# Patient Record
Sex: FEMALE | Race: WHITE | Employment: OTHER | URBAN - METROPOLITAN AREA
[De-identification: names, ages, dates, MRNs, and addresses within clinical notes are randomized per-mention and may not be internally consistent; named-entity substitution may affect disease eponyms.]

---

## 2019-01-10 ENCOUNTER — HOSPITAL ENCOUNTER (OUTPATIENT)
Dept: MRI IMAGING | Age: 79
Discharge: HOME | End: 2019-01-10
Attending: FAMILY MEDICINE
Payer: MEDICARE

## 2019-01-10 DIAGNOSIS — R41.3 MEMORY LOSS: ICD-10-CM

## 2019-01-10 PROCEDURE — 70553 MRI BRAIN STEM W/O & W/DYE: CPT

## 2019-01-10 PROCEDURE — A9575 INJ GADOTERATE MEGLUMI 0.1ML: HCPCS

## 2019-01-10 PROCEDURE — 74011250636 HC RX REV CODE- 250/636

## 2019-01-10 RX ORDER — GADOTERATE MEGLUMINE 376.9 MG/ML
10 INJECTION INTRAVENOUS
Status: COMPLETED | OUTPATIENT
Start: 2019-01-10 | End: 2019-01-10

## 2019-01-10 RX ADMIN — GADOTERATE MEGLUMINE 10 ML: 376.9 INJECTION INTRAVENOUS at 10:07

## 2019-02-22 ENCOUNTER — OFFICE VISIT (OUTPATIENT)
Dept: NEUROLOGY | Age: 79
End: 2019-02-22

## 2019-02-22 VITALS
HEART RATE: 74 BPM | SYSTOLIC BLOOD PRESSURE: 122 MMHG | DIASTOLIC BLOOD PRESSURE: 70 MMHG | WEIGHT: 124.2 LBS | HEIGHT: 61 IN | BODY MASS INDEX: 23.45 KG/M2 | OXYGEN SATURATION: 99 % | RESPIRATION RATE: 16 BRPM

## 2019-02-22 DIAGNOSIS — G31.84 MILD COGNITIVE IMPAIRMENT: ICD-10-CM

## 2019-02-22 DIAGNOSIS — R41.3 MEMORY LOSS: Primary | ICD-10-CM

## 2019-02-22 RX ORDER — PRAVASTATIN SODIUM 20 MG/1
20 TABLET ORAL
COMMUNITY

## 2019-02-22 RX ORDER — CITALOPRAM 10 MG/1
10 TABLET ORAL DAILY
Qty: 30 TAB | Refills: 1 | Status: SHIPPED | OUTPATIENT
Start: 2019-02-22 | End: 2019-06-08 | Stop reason: SDUPTHER

## 2019-02-22 RX ORDER — CITALOPRAM 20 MG/1
TABLET, FILM COATED ORAL DAILY
COMMUNITY
End: 2019-06-10 | Stop reason: SDUPTHER

## 2019-02-22 NOTE — PROGRESS NOTES
Chief Complaint Patient presents with  Memory Loss HISTORY OF PRESENT ILLNESS Tran Velasquez is a 70 y.o. female who came in for neurological consultation requested by Dr. Dorothea Bennett. She came with her  and daughter. She has been having short-term memory issues for the past couple years. Family says that she will tend to ask the same questions or will repeat something that she has already said. She has also had some trouble remembering people's names and at times will have difficulty cooking recipes that she has done for a long period. She will by certain grocery items twice as she forgets that she has already it bought before. She has had occasional confusion about where she is and how to get to a certain place while driving but it eventually comes to her. Denies any headache, changes in vision, speech, swallowing ability, focal motor or sensory deficits, tremors, balance problems or falls. She takes citalopram for anxiety and has been on it for several years. She keeps wondering that if it was the medication that was giving her memory problem. She does feel anxious every now and then but nothing significant. History reviewed. No pertinent past medical history. Current Outpatient Medications Medication Sig  
 citalopram (CELEXA) 20 mg tablet Take  by mouth daily.  pravastatin (PRAVACHOL) 20 mg tablet Take 20 mg by mouth nightly.  citalopram (CELEXA) 10 mg tablet Take 1 Tab by mouth daily. No current facility-administered medications for this visit. Not on File Family History Problem Relation Age of Onset  Dementia Mother  Heart Disease Father  Cancer Maternal Aunt Social History Tobacco Use  Smoking status: Never Smoker  Smokeless tobacco: Never Used Substance Use Topics  Alcohol use: Yes Frequency: Never Comment: 4-6 glasses wine  Drug use: Not on file Past Surgical History:  
Procedure Laterality Date  HX ORTHOPAEDIC REVIEW OF SYSTEMS Review of Systems - History obtained from the patient Psychological ROS: positive for - anxiety ENT ROS: negative Hematological and Lymphatic ROS: negative Endocrine ROS: negative Respiratory ROS: no cough, shortness of breath, or wheezing Cardiovascular ROS: no chest pain or dyspnea on exertion Gastrointestinal ROS: no abdominal pain, change in bowel habits, or black or bloody stools Genito-Urinary ROS: no dysuria, trouble voiding, or hematuria Musculoskeletal ROS: negative Dermatological ROS: negative PHYSICAL EXAMINATION:   
Visit Vitals /70 Pulse 74 Resp 16 Ht 5' 1\" (1.549 m) Wt 56.3 kg (124 lb 3.2 oz) SpO2 99% BMI 23.47 kg/m² General:  Well defined, nourished, and groomed individual in no acute distress. Neck: Supple, nontender, no bruits, no pain with resistance to active range of motion. Heart: Regular rate and rhythm, no murmurs, rub, or gallop. Normal S1S2. Lungs:  Clear to auscultation bilaterally with equal chest expansion, no cough, no wheeze Musculoskeletal:  Extremities revealed no edema and had full range of motion of joints. Psych:  Good mood and bright affect NEUROLOGICAL EXAMINATION:    
Mental Status:   Alert and oriented to person, place, and time. She scored 25/30 on Hazel Hurst cognitive assessment. Her delayed recall was 0/5. Attention span and concentration are normal. Speech is fluent with a full fund of knowledge. Cranial Nerves:   
II, III, IV, VI:  Visual acuity grossly intact. Visual fields are normal.   
Pupils are equal, round, and reactive to light and accommodation. Extra-ocular movements are full and fluid. Fundoscopic exam was benign, no ptosis or nystagmus. V-XII: Hearing is grossly intact. Facial features are symmetric, with normal sensation and strength. The palate rises symmetrically and the tongue protrudes midline. Sternocleidomastoids 5/5. Motor Examination: Normal tone, bulk, and strength. 5/5 muscle strength throughout. No cogwheel rigidity or clonus present. Sensory exam:  Normal throughout to pinprick, temperature, and vibration sense. Normal proprioception. Coordination:  Heel-to-shin was smooth and symmetrical bilaterally. Finger to nose and rapid arm movement testing was normal.   No resting or intention tremor Gait and Station:  Steady while walking on toes, heels, and with tandem walking. Normal arm swing. No Rhomberg or pronator drift. No muscle wasting or fasiculations noted. Reflexes:  DTRs 2+ throughout. Toes downgoing. LABS / IMAGING 
MRI of the brain done recently showed nonspecific age-related changes, per patient and family ASSESSMENT 
  ICD-10-CM ICD-9-CM 1. Memory loss R41.3 780.93 VITAMIN B12 & FOLATE  
   TSH 3RD GENERATION  
   citalopram (CELEXA) 10 mg tablet REFERRAL TO NEUROPSYCHOLOGY 2. Mild cognitive impairment G31.84 331.83 DISCUSSION Ms. Madhav Kelsey has significantly impaired short-term memory but did quite well on all other cognitive domains. We discussed that this could be mild cognitive impairment affecting short-term memory. MCI can have different courses-it can remained stable, improve or some often consider this as early stages of neurodegenerative dementia of Alzheimer's type We will continue monitoring her clinically and deferring any pharmacologic therapy for now Check B12 level and TSH Given patient's concern of medication side effects, will try lowering the dose of citalopram.  If her symptoms worsen, she will need to increase it back up Obtain comprehensive neuropsychological assessment to rule out underlying anxiety/attention and concentration deficit as a contributing factor Thank you for allowing me to participate in the care of Ms. Jeaneth Chaudhry. Please feel free to contact me if you have any questions.  I will be happy to follow to follow her along with you. Greta Lara MD 
Diplomate, American Board of Psychiatry & Neurology (Neurology) [place] Board of Psychiatry & Neurology (Clinical Neurophysiology) Diplomate, [address] [place] [address] Board of Electrodiagnostic Medicine This note will not be viewable in [address]

## 2019-02-22 NOTE — PROGRESS NOTES
Ms. Marcelino Patel presents as a new patient for evaluation of memory loss. Her family is present at appointment and they report a decline in patient's memory over the past two years. Depression screening done on patient.

## 2019-02-22 NOTE — PATIENT INSTRUCTIONS
A Healthy Lifestyle: Care Instructions Your Care Instructions A healthy lifestyle can help you feel good, stay at a healthy weight, and have plenty of energy for both work and play. A healthy lifestyle is something you can share with your whole family. A healthy lifestyle also can lower your risk for serious health problems, such as high blood pressure, heart disease, and diabetes. You can follow a few steps listed below to improve your health and the health of your family. Follow-up care is a key part of your treatment and safety. Be sure to make and go to all appointments, and call your doctor if you are having problems. It's also a good idea to know your test results and keep a list of the medicines you take. How can you care for yourself at home? · Do not eat too much sugar, fat, or fast foods. You can still have dessert and treats now and then. The goal is moderation. · Start small to improve your eating habits. Pay attention to portion sizes, drink less juice and soda pop, and eat more fruits and vegetables. ? Eat a healthy amount of food. A 3-ounce serving of meat, for example, is about the size of a deck of cards. Fill the rest of your plate with vegetables and whole grains. ? Limit the amount of soda and sports drinks you have every day. Drink more water when you are thirsty. ? Eat at least 5 servings of fruits and vegetables every day. It may seem like a lot, but it is not hard to reach this goal. A serving or helping is 1 piece of fruit, 1 cup of vegetables, or 2 cups of leafy, raw vegetables. Have an apple or some carrot sticks as an afternoon snack instead of a candy bar. Try to have fruits and/or vegetables at every meal. 
· Make exercise part of your daily routine. You may want to start with simple activities, such as walking, bicycling, or slow swimming. Try to be active 30 to 60 minutes every day.  You do not need to do all 30 to 60 minutes all at once. For example, you can exercise 3 times a day for 10 or 20 minutes. Moderate exercise is safe for most people, but it is always a good idea to talk to your doctor before starting an exercise program. 
· Keep moving. Arie Buddle the lawn, work in the garden, or beModel. Take the stairs instead of the elevator at work. · If you smoke, quit. People who smoke have an increased risk for heart attack, stroke, cancer, and other lung illnesses. Quitting is hard, but there are ways to boost your chance of quitting tobacco for good. ? Use nicotine gum, patches, or lozenges. ? Ask your doctor about stop-smoking programs and medicines. ? Keep trying. In addition to reducing your risk of diseases in the future, you will notice some benefits soon after you stop using tobacco. If you have shortness of breath or asthma symptoms, they will likely get better within a few weeks after you quit. · Limit how much alcohol you drink. Moderate amounts of alcohol (up to 2 drinks a day for men, 1 drink a day for women) are okay. But drinking too much can lead to liver problems, high blood pressure, and other health problems. Family health If you have a family, there are many things you can do together to improve your health. · Eat meals together as a family as often as possible. · Eat healthy foods. This includes fruits, vegetables, lean meats and dairy, and whole grains. · Include your family in your fitness plan. Most people think of activities such as jogging or tennis as the way to fitness, but there are many ways you and your family can be more active. Anything that makes you breathe hard and gets your heart pumping is exercise. Here are some tips: 
? Walk to do errands or to take your child to school or the bus. 
? Go for a family bike ride after dinner instead of watching TV. Where can you learn more? Go to http://claudette-katarzyna.info/. Enter F277 in the search box to learn more about \"A Healthy Lifestyle: Care Instructions. \" Current as of: September 11, 2018 Content Version: 11.9 © 0164-5001 [employer]. Care instructions adapted under license by [employer] (which disclaims liability or warranty for this information). If you have questions about a medical condition or this instruction, always ask your healthcare professional. [place] [address] any warranty or liability for your use of this information.

## 2019-02-23 LAB
FOLATE SERPL-MCNC: 6.8 NG/ML
TSH SERPL DL<=0.005 MIU/L-ACNC: 2.01 UIU/ML (ref 0.45–4.5)
VIT B12 SERPL-MCNC: 274 PG/ML (ref 232–1245)

## 2019-02-28 ENCOUNTER — DOCUMENTATION ONLY (OUTPATIENT)
Dept: NEUROLOGY | Age: 79
End: 2019-02-28

## 2019-03-01 ENCOUNTER — TELEPHONE (OUTPATIENT)
Dept: NEUROLOGY | Age: 79
End: 2019-03-01

## 2019-03-01 NOTE — TELEPHONE ENCOUNTER
I reviewed lab results with patient, per Dr. Vic Agudelo. Patient agrees to start Vit B12 1,000mcg sublingual every day.

## 2019-08-20 ENCOUNTER — OFFICE VISIT (OUTPATIENT)
Dept: NEUROLOGY | Age: 79
End: 2019-08-20

## 2019-08-20 VITALS
RESPIRATION RATE: 14 BRPM | BODY MASS INDEX: 23.22 KG/M2 | WEIGHT: 123 LBS | DIASTOLIC BLOOD PRESSURE: 65 MMHG | SYSTOLIC BLOOD PRESSURE: 137 MMHG | HEIGHT: 61 IN | OXYGEN SATURATION: 98 % | HEART RATE: 66 BPM

## 2019-08-20 DIAGNOSIS — F03.90 DEMENTIA WITHOUT BEHAVIORAL DISTURBANCE, UNSPECIFIED DEMENTIA TYPE: Primary | ICD-10-CM

## 2019-08-20 DIAGNOSIS — E53.8 LOW SERUM VITAMIN B12: ICD-10-CM

## 2019-08-20 RX ORDER — DONEPEZIL HYDROCHLORIDE 10 MG/1
5 TABLET, FILM COATED ORAL
COMMUNITY

## 2019-08-20 NOTE — PROGRESS NOTES
Pt is following up on her memory. Someone else put her on Aricept but she is not sure of the dose. Maybe a little worse since we last saw her.

## 2019-08-20 NOTE — PROGRESS NOTES
Chief Complaint   Patient presents with    Memory Loss         HISTORY OF PRESENT ILLNESS  Crystal Spencer came back for follow-up. She came with her  and daughter. Family tells me that she is not having more difficulties with her short-term memory. Remains independent with activities of daily living. She does household activities without problem. She has been driving and got lost.  Does not go much out of the house in general.  Does some crossword puzzles during the day. She does cook and denies difficulties with recipes. She had comprehensive neuropsychological assessment and the report was reviewed. It was consistent with dementia and anxiety. She has now been started on donepezil and is taking 10 mg daily. She had difficulty tolerating it initially but now she is fine. RECAP  She has been having short-term memory issues for the past couple years. Family says that she will tend to ask the same questions or will repeat something that she has already said. She has also had some trouble remembering people's names and at times will have difficulty cooking recipes that she has done for a long period. She will by certain grocery items twice as she forgets that she has already it bought before. She has had occasional confusion about where she is and how to get to a certain place while driving but it eventually comes to her. Denies any headache, changes in vision, speech, swallowing ability, focal motor or sensory deficits, tremors, balance problems or falls. She takes citalopram for anxiety and has been on it for several years. She keeps wondering that if it was the medication that was giving her memory problem. She does feel anxious every now and then but nothing significant. History reviewed. No pertinent past medical history. Current Outpatient Medications   Medication Sig    donepezil (ARICEPT) 10 mg tablet Take 10 mg by mouth nightly.     pravastatin (PRAVACHOL) 20 mg tablet Take 20 mg by mouth nightly.  citalopram (CELEXA) 20 mg tablet Take 1 Tab by mouth daily. No current facility-administered medications for this visit. PHYSICAL EXAMINATION:    Visit Vitals  /65   Pulse 66   Resp 14   Ht 5' 1\" (1.549 m)   Wt 55.8 kg (123 lb)   SpO2 98%   BMI 23.24 kg/m²         NEUROLOGICAL EXAMINATION:     Mental Status:   Alert and oriented to person, place, and time. He scored 23/30 on Wilbert cognitive assessment. Short-term recall was 0/5. She had mild difficulties with today's date correctly, serial 7 subtractions and abstraction   Attention span and concentration are normal. Speech is fluent . Cranial Nerves:    II, III, IV, VI:  Visual acuity grossly intact. Visual fields are normal.    Pupils are equal, round, and reactive. Extra-ocular movements are full and fluid. V-XII: Hearing is grossly intact. Facial features are symmetric, with normal sensation and strength. The palate rises symmetrically and the tongue protrudes midline. Motor Examination: Normal tone, bulk, and strength. Sensory exam:  Normal throughout     Coordination:  Finger to nose and rapid arm movement testing was normal.   No resting or intention tremor    Gait and Station:  Steady. Normal arm swing. No muscle wasting or fasiculations noted. LABS / IMAGING    MRI of the brain done recently showed nonspecific age-related changes, per patient and family  Lab Results   Component Value Date/Time    Vitamin B12 274 02/22/2019 01:51 PM    Folate 6.8 02/22/2019 01:51 PM     Lab Results   Component Value Date/Time    TSH 2.010 02/22/2019 01:51 PM       ASSESSMENT    ICD-10-CM ICD-9-CM    1. Dementia without behavioral disturbance, unspecified dementia type F03.90 294.20    2.  Low serum vitamin B12 E53.8 266.2        DISCUSSION  Ms. Mervat Lanza has has mild dementia, with mixed type   She has significantly impaired short-term memory but did quite well on all other cognitive domains. There has been a slight decline in her cognitive score in the last 6 months  Agree with continuing donepezil for now  Resources to strengthen her memory were discussed  She should try to stay physically active as well  Repeat B12 level   Continue citalopram  It would be best for her to avoid driving as she will have a high tendency to get lost  She has a very supportive family and is under 24-hour supervision  Continue clinical follow-up    I spent greater than 25 minutes with the patient and more than 50% of the time was spent in counseling and coordination of care. Scot Smith MD  Diplomate, American Board of Psychiatry & Neurology (Neurology)  Yasmin Horvath Board of Psychiatry & Neurology (Clinical Neurophysiology)  Diplomate, American Board of Electrodiagnostic Medicine  This note will not be viewable in [address]

## 2019-08-21 ENCOUNTER — TELEPHONE (OUTPATIENT)
Dept: NEUROLOGY | Age: 79
End: 2019-08-21

## 2019-08-21 NOTE — TELEPHONE ENCOUNTER
[facility], \"pls  Ew:alling to adv. Dr. Konstantin Porter re r/x.  Donepezil\"

## 2019-08-21 NOTE — TELEPHONE ENCOUNTER
Per caller, patient is on Aricept 5mg and would like to know if she needs to increase to 10mg. Please advise.

## 2019-09-28 LAB — VIT B12 SERPL-MCNC: >2000 PG/ML (ref 232–1245)

## 2020-02-25 ENCOUNTER — OFFICE VISIT (OUTPATIENT)
Dept: NEUROLOGY | Age: 80
End: 2020-02-25

## 2020-02-25 VITALS
SYSTOLIC BLOOD PRESSURE: 129 MMHG | RESPIRATION RATE: 16 BRPM | WEIGHT: 121 LBS | BODY MASS INDEX: 22.84 KG/M2 | OXYGEN SATURATION: 99 % | HEART RATE: 65 BPM | DIASTOLIC BLOOD PRESSURE: 75 MMHG | HEIGHT: 61 IN

## 2020-02-25 DIAGNOSIS — F03.90 DEMENTIA WITHOUT BEHAVIORAL DISTURBANCE, UNSPECIFIED DEMENTIA TYPE: Primary | ICD-10-CM

## 2020-02-25 NOTE — PROGRESS NOTES
MsKrishan Cristobal Party presents today to follow up memory loss. Depression screening done on patient.

## 2020-02-25 NOTE — PATIENT INSTRUCTIONS
PRESCRIPTION REFILL POLICY [facility] Statement to Patients April 1, 2014 In an effort to ensure the large volume of patient prescription refills is processed in the most efficient and expeditious manner, we are asking our patients to assist us by calling your Pharmacy for all prescription refills, this will include also your  Mail Order Pharmacy. The pharmacy will contact our office electronically to continue the refill process. Please do not wait until the last minute to call your pharmacy. We need at least 48 hours (2days) to fill prescriptions. We also encourage you to call your pharmacy before going to  your prescription to make sure it is ready. With regard to controlled substance prescription refill requests (narcotic refills) that need to be picked up at our office, we ask your cooperation by providing us with at least 72 hours (3days) notice that you will need a refill. We will not refill narcotic prescription refill requests after 4:00pm on any weekday, Monday through Thursday, or after 2:00pm on Fridays, or on the weekends. We encourage everyone to explore another way of getting your prescription refill request processed using Code42, our patient web portal through our electronic medical record system. Code42 is an efficient and effective way to communicate your medication request directly to the office and  downloadable as an brant on your smart phone . Code42 also features a review functionality that allows you to view your medication list as well as leave messages for your physician. Are you ready to get connected? If so please review the attatched instructions or speak to any of our staff to get you set up right away! Thank you so much for your cooperation. Should you have any questions please contact our Practice Administrator. The Physicians and Staff,  [facility]

## 2020-02-25 NOTE — PROGRESS NOTES
Chief Complaint   Patient presents with    Memory Loss         HISTORY OF PRESENT ILLNESS  Merlinda Oris came back for follow-up. She came with her  and daughter. Per family, she seems to be having some difficulties with long-term memory as well. Overall, she remains independent with activities of daily living. She still cooks, does things around the house, does sudoku and goes out for a walk regularly. Sometimes she will have difficulties with complex recipes. She drives but only does short distances. She had comprehensive neuropsychological assessment and the report was reviewed. It was consistent with dementia and anxiety. She is taking donepezil but only takes 5 mg daily. She thinks she was having difficulties with it at 10 mg     RECAP  She has been having short-term memory issues for the past couple years. Family says that she will tend to ask the same questions or will repeat something that she has already said. She has also had some trouble remembering people's names and at times will have difficulty cooking recipes that she has done for a long period. She will by certain grocery items twice as she forgets that she has already it bought before. She has had occasional confusion about where she is and how to get to a certain place while driving but it eventually comes to her. Denies any headache, changes in vision, speech, swallowing ability, focal motor or sensory deficits, tremors, balance problems or falls. She takes citalopram for anxiety and has been on it for several years. She keeps wondering that if it was the medication that was giving her memory problem. She does feel anxious every now and then but nothing significant. History reviewed. No pertinent past medical history. Current Outpatient Medications   Medication Sig    donepezil (ARICEPT) 10 mg tablet Take 5 mg by mouth nightly.  citalopram (CELEXA) 20 mg tablet Take 1 Tab by mouth daily.     pravastatin (PRAVACHOL) 20 mg tablet Take 20 mg by mouth nightly. No current facility-administered medications for this visit. PHYSICAL EXAMINATION:    Visit Vitals  /75   Pulse 65   Resp 16   Ht 5' 1\" (1.549 m)   Wt 54.9 kg (121 lb)   SpO2 99%   BMI 22.86 kg/m²         NEUROLOGICAL EXAMINATION:     Mental Status:   Alert and oriented to person, place, and time. He scored 23/30 on Graham cognitive assessment. Short-term recall was 0/5. She had difficulties with today's date correctly,   Attention span and concentration are normal. Speech is fluent . Cranial Nerves:    II, III, IV, VI:  Visual acuity grossly intact. Visual fields are normal.    Pupils are equal, round, and reactive. Extra-ocular movements are full and fluid. V-XII: Hearing is grossly intact. Facial features are symmetric, with normal sensation and strength. The palate rises symmetrically and the tongue protrudes midline. Motor Examination: Normal tone, bulk, and strength. Sensory exam:  Normal throughout     Coordination:  Finger to nose and rapid arm movement testing was normal.   No resting or intention tremor    Gait and Station:  Steady. Normal arm swing. No muscle wasting or fasiculations noted. LABS / IMAGING    MRI of the brain done recently showed nonspecific age-related changes, per patient and family  Lab Results   Component Value Date/Time    Vitamin B12 >2000 (H) 09/27/2019 04:01 PM    Folate 6.8 02/22/2019 01:51 PM     Lab Results   Component Value Date/Time    TSH 2.010 02/22/2019 01:51 PM       ASSESSMENT    ICD-10-CM ICD-9-CM    1. Dementia without behavioral disturbance, unspecified dementia type [facility]) F03.90 294.20        DISCUSSION  Ms. Roseline Ponce has has mild to moderate dementia, mixed type   She has significantly impaired short-term memory but did quite well on all other cognitive domains.    Her scores have been stable over the past 6 months  Agree with continuing donepezil for now and she should try to titrate up the dose to 10 mg if she can tolerate  Resources to strengthen her memory were discussed  Continue to do physical activity as she is doing  Continue citalopram  It would be best for her to avoid driving as she will have a high tendency to get lost  She has a very supportive family and is under 24-hour supervision  Continue periodic follow-up    I spent greater than 25 minutes with the patient and more than 50% of the time was spent in counseling and coordination of care. Danilo Awad MD  Diplomate, American Board of Psychiatry & Neurology (Neurology)  [place]iky [place] Board of Psychiatry & Neurology (Clinical Neurophysiology)  Diplomate, American Board of Electrodiagnostic Medicine  This note will not be viewable in [address]

## 2020-09-11 ENCOUNTER — OFFICE VISIT (OUTPATIENT)
Dept: NEUROLOGY | Facility: CLINIC | Age: 80
End: 2020-09-11
Payer: MEDICARE

## 2020-09-11 VITALS
RESPIRATION RATE: 16 BRPM | BODY MASS INDEX: 22.84 KG/M2 | WEIGHT: 121 LBS | SYSTOLIC BLOOD PRESSURE: 102 MMHG | DIASTOLIC BLOOD PRESSURE: 70 MMHG | HEIGHT: 61 IN | OXYGEN SATURATION: 98 % | HEART RATE: 72 BPM

## 2020-09-11 DIAGNOSIS — F03.90 DEMENTIA WITHOUT BEHAVIORAL DISTURBANCE, UNSPECIFIED DEMENTIA TYPE: Primary | ICD-10-CM

## 2020-09-11 PROCEDURE — 1090F PRES/ABSN URINE INCON ASSESS: CPT | Performed by: PSYCHIATRY & NEUROLOGY

## 2020-09-11 PROCEDURE — G8510 SCR DEP NEG, NO PLAN REQD: HCPCS | Performed by: PSYCHIATRY & NEUROLOGY

## 2020-09-11 PROCEDURE — G8427 DOCREV CUR MEDS BY ELIG CLIN: HCPCS | Performed by: PSYCHIATRY & NEUROLOGY

## 2020-09-11 PROCEDURE — 99214 OFFICE O/P EST MOD 30 MIN: CPT | Performed by: PSYCHIATRY & NEUROLOGY

## 2020-09-11 PROCEDURE — G8400 PT W/DXA NO RESULTS DOC: HCPCS | Performed by: PSYCHIATRY & NEUROLOGY

## 2020-09-11 PROCEDURE — G8420 CALC BMI NORM PARAMETERS: HCPCS | Performed by: PSYCHIATRY & NEUROLOGY

## 2020-09-11 PROCEDURE — G8536 NO DOC ELDER MAL SCRN: HCPCS | Performed by: PSYCHIATRY & NEUROLOGY

## 2020-09-11 PROCEDURE — 1101F PT FALLS ASSESS-DOCD LE1/YR: CPT | Performed by: PSYCHIATRY & NEUROLOGY

## 2020-09-11 RX ORDER — MEMANTINE HYDROCHLORIDE 5 MG-10 MG
KIT ORAL
Qty: 30 TAB | Refills: 0 | Status: SHIPPED | OUTPATIENT
Start: 2020-09-11 | End: 2020-10-18 | Stop reason: SDUPTHER

## 2020-09-11 NOTE — PROGRESS NOTES
Chief Complaint   Patient presents with    Memory Loss         HISTORY OF PRESENT ILLNESS  Jose Antonio Presley came back for follow-up. She came with her . Overall, there has been some decline in  her memory. Sometimes so she will have problems with logical thinking and reasoning. Still remains independent with activities of daily living. She still cooks, does things around the house, does sudoku and goes out for a walk regularly. She does not drive much    She had comprehensive neuropsychological assessment and it was consistent with dementia and anxiety. She is taking donepezil but only takes 5 mg daily. She thinks she was having difficulties with it at 10 mg     RECAP  She has been having short-term memory issues for the past couple years. Family says that she will tend to ask the same questions or will repeat something that she has already said. She has also had some trouble remembering people's names and at times will have difficulty cooking recipes that she has done for a long period. She will by certain grocery items twice as she forgets that she has already it bought before. She has had occasional confusion about where she is and how to get to a certain place while driving but it eventually comes to her. Denies any headache, changes in vision, speech, swallowing ability, focal motor or sensory deficits, tremors, balance problems or falls. She takes citalopram for anxiety and has been on it for several years. She keeps wondering that if it was the medication that was giving her memory problem. She does feel anxious every now and then but nothing significant. History reviewed. No pertinent past medical history. Current Outpatient Medications   Medication Sig    memantine 5-10 mg DsPk Take as directed    donepezil (ARICEPT) 10 mg tablet Take 5 mg by mouth nightly.  citalopram (CELEXA) 20 mg tablet Take 1 Tab by mouth daily.     pravastatin (PRAVACHOL) 20 mg tablet Take 20 mg by mouth nightly. No current facility-administered medications for this visit. PHYSICAL EXAMINATION:    Visit Vitals  /70   Pulse 72   Resp 16   Ht 5' 1\" (1.549 m)   Wt 54.9 kg (121 lb)   SpO2 98%   BMI 22.86 kg/m²         NEUROLOGICAL EXAMINATION:     Mental Status:   Alert and oriented to person, place, and time. He scored 22/30 on [place] cognitive assessment. Short-term recall was 0/5. She had difficulties with today's date correctly,   Attention span and concentration are normal. Speech is fluent . Cranial Nerves:    II, III, IV, VI:  Visual acuity grossly intact. Visual fields are normal.    Pupils are equal, round, and reactive. Extra-ocular movements are full and fluid. V-XII: Hearing is grossly intact. Facial features are symmetric, with normal sensation and strength. The palate rises symmetrically and the tongue protrudes midline. Motor Examination: Normal tone, bulk, and strength. Sensory exam:  Normal throughout     Coordination:  Finger to nose and rapid arm movement testing was normal.   No resting or intention tremor    Gait and Station:  Steady. Normal arm swing. No muscle wasting or fasiculations noted. LABS / IMAGING    MRI of the brain done recently showed nonspecific age-related changes, per patient and family  Lab Results   Component Value Date/Time    Vitamin B12 >2000 (H) 09/27/2019 04:01 PM    Folate 6.8 02/22/2019 01:51 PM     Lab Results   Component Value Date/Time    TSH 2.010 02/22/2019 01:51 PM       ASSESSMENT    ICD-10-CM ICD-9-CM    1. Dementia without behavioral disturbance, unspecified dementia type ([facility] [address]  F03.90 294.20 memantine 5-10 mg DsPk       DISCUSSION  Ms. Tania Beavers has has mild to moderate dementia, mixed type   She has significantly impaired short-term memory but did quite well on all other cognitive domains.    There has been a subtle decline over the past 6 months  She can only tolerate 5 mg of donepezil  Will start Namenda at 5 mg daily and titrate in a customary fashion to 10 mg twice daily  Continue to stay physically and mentally active  It would be best for her to avoid driving as she will have a high tendency to get lost  She has a very supportive family and is under 24-hour supervision  Follow-up in 6 months    I spent greater than 25 minutes with the patient and more than 50% of the time was spent in counseling and coordination of care. Mason Acosta MD  Diplomate, American Board of Psychiatry & Neurology (Neurology)  Shanel Robledo Board of Psychiatry & Neurology (Clinical Neurophysiology)  Diplomate, American Board of Electrodiagnostic Medicine    This note will not be viewable in [address]

## 2020-09-11 NOTE — PROGRESS NOTES
Ms. Pandya Signs present today to follow up memory loss. Patient's spouse reported patient has good days and bad days.

## 2020-10-16 DIAGNOSIS — F03.90 DEMENTIA WITHOUT BEHAVIORAL DISTURBANCE, UNSPECIFIED DEMENTIA TYPE: ICD-10-CM

## 2020-10-16 NOTE — TELEPHONE ENCOUNTER
Requested Prescriptions     Pending Prescriptions Disp Refills    memantine 5-10 mg DsPk 30 Tab 0     Sig: Take as directed

## 2020-10-18 DIAGNOSIS — F03.90 DEMENTIA WITHOUT BEHAVIORAL DISTURBANCE, UNSPECIFIED DEMENTIA TYPE: Primary | ICD-10-CM

## 2020-10-18 RX ORDER — MEMANTINE HYDROCHLORIDE 10 MG/1
10 TABLET ORAL 2 TIMES DAILY
Qty: 60 TAB | Refills: 2 | Status: SHIPPED | OUTPATIENT
Start: 2020-10-18 | End: 2021-01-11

## 2020-10-18 RX ORDER — MEMANTINE HYDROCHLORIDE 5 MG-10 MG
KIT ORAL
Qty: 30 TAB | Refills: 0 | OUTPATIENT
Start: 2020-10-18

## 2020-10-20 NOTE — TELEPHONE ENCOUNTER
----- Message from [facility] sent at 10/20/2020  2:19 PM EDT -----  Regarding: Dr Violette Arnold  Pt is calling to check the status of her refill on Memantine HCL 10 mg, requested it on Friday, please call pt at [phone]

## 2020-10-21 ENCOUNTER — TELEPHONE (OUTPATIENT)
Dept: NEUROLOGY | Facility: CLINIC | Age: 80
End: 2020-10-21

## 2020-10-21 NOTE — TELEPHONE ENCOUNTER
Pt calling because pharmacy says they haven't received the refill on her Namenda. Can you please let the patient know what to do?

## 2020-10-22 NOTE — TELEPHONE ENCOUNTER
----- Message from Lenny Bennett sent at 10/22/2020  2:34 PM EDT -----  Regarding: Dr Brooklyn Ba  Patient return call    Caller's first and last name and relationship (if not the patient):      Best contact number(s):[phone]      Whose call is being returned: nurse      Details to clarify the request: following up on message left that her rx was called into [facility] ,  when she called was told they did not received anything yet, please confirm which [facility] was called into , if was not sent to   [facility] [address] (V)[phone] pharmacy please re route to the one on [address]     Please call back today  to confirm today the location.       Lenny Bennett

## 2021-01-11 DIAGNOSIS — F03.90 DEMENTIA WITHOUT BEHAVIORAL DISTURBANCE, UNSPECIFIED DEMENTIA TYPE: ICD-10-CM

## 2021-01-11 RX ORDER — MEMANTINE HYDROCHLORIDE 10 MG/1
TABLET ORAL
Qty: 60 TAB | Refills: 2 | Status: SHIPPED | OUTPATIENT
Start: 2021-01-11 | End: 2021-01-25 | Stop reason: SDUPTHER

## 2021-01-25 DIAGNOSIS — F03.90 DEMENTIA WITHOUT BEHAVIORAL DISTURBANCE, UNSPECIFIED DEMENTIA TYPE: ICD-10-CM

## 2021-01-25 RX ORDER — MEMANTINE HYDROCHLORIDE 10 MG/1
10 TABLET ORAL 2 TIMES DAILY
Qty: 180 TAB | Refills: 2 | Status: SHIPPED | OUTPATIENT
Start: 2021-01-25 | End: 2021-11-04

## 2021-01-25 NOTE — TELEPHONE ENCOUNTER
----- Message from [employer] sent at 1/25/2021 12:25 PM EST -----  Regarding: Dr. Laurie Becerra (if not patient):  N/A      Relationship of caller (if not patient):  N/A      Best contact number(s):  [phone]      Name of medication and dosage if known:  Memantine, 10mg      Is patient out of this medication (yes/no): Y      Pharmacy name:  [address] [place] [address] listed in chart? (yes/no): Y    Pharmacy phone number:  [phone]      Details to clarify the request:  Patient is requesting a call back if Dr. Cedric Weiner no longer wants her taking this medication.       Abby

## 2021-02-10 ENCOUNTER — HOSPITAL ENCOUNTER (OUTPATIENT)
Dept: PREADMISSION TESTING | Age: 81
Discharge: HOME | End: 2021-02-10
Payer: MEDICARE

## 2021-02-10 VITALS
WEIGHT: 128.31 LBS | SYSTOLIC BLOOD PRESSURE: 135 MMHG | BODY MASS INDEX: 24.22 KG/M2 | DIASTOLIC BLOOD PRESSURE: 79 MMHG | TEMPERATURE: 98.2 F | HEART RATE: 69 BPM | HEIGHT: 61 IN

## 2021-02-10 LAB
ABO + RH BLD: NORMAL
ANION GAP SERPL CALC-SCNC: 5 MMOL/L (ref 5–15)
APPEARANCE UR: CLEAR
ATRIAL RATE: 63 BPM
BACTERIA URNS QL MICRO: NEGATIVE /HPF
BILIRUB UR QL: NEGATIVE
BLOOD GROUP ANTIBODIES SERPL: NORMAL
BUN SERPL-MCNC: 19 MG/DL (ref 6–20)
BUN/CREAT SERPL: 26 (ref 12–20)
CALCIUM SERPL-MCNC: 9.5 MG/DL (ref 8.5–10.1)
CALCULATED P AXIS, ECG09: 35 DEGREES
CALCULATED R AXIS, ECG10: 10 DEGREES
CALCULATED T AXIS, ECG11: 45 DEGREES
CHLORIDE SERPL-SCNC: 108 MMOL/L (ref 97–108)
CO2 SERPL-SCNC: 28 MMOL/L (ref 21–32)
COLOR UR: NORMAL
CREAT SERPL-MCNC: 0.74 MG/DL (ref 0.55–1.02)
DIAGNOSIS, 93000: NORMAL
EPITH CASTS URNS QL MICRO: NORMAL /LPF
ERYTHROCYTE [DISTWIDTH] IN BLOOD BY AUTOMATED COUNT: 12.6 % (ref 11.5–14.5)
EST. AVERAGE GLUCOSE BLD GHB EST-MCNC: 114 MG/DL
GLUCOSE SERPL-MCNC: 84 MG/DL (ref 65–100)
GLUCOSE UR STRIP.AUTO-MCNC: NEGATIVE MG/DL
HBA1C MFR BLD: 5.6 % (ref 4–5.6)
HCT VFR BLD AUTO: 38.6 % (ref 35–47)
HGB BLD-MCNC: 12.8 G/DL (ref 11.5–16)
HGB UR QL STRIP: NEGATIVE
HYALINE CASTS URNS QL MICRO: NORMAL /LPF (ref 0–5)
INR PPP: 1.1 (ref 0.9–1.1)
KETONES UR QL STRIP.AUTO: NEGATIVE MG/DL
LEUKOCYTE ESTERASE UR QL STRIP.AUTO: NEGATIVE
MCH RBC QN AUTO: 32.9 PG (ref 26–34)
MCHC RBC AUTO-ENTMCNC: 33.2 G/DL (ref 30–36.5)
MCV RBC AUTO: 99.2 FL (ref 80–99)
NITRITE UR QL STRIP.AUTO: NEGATIVE
NRBC # BLD: 0 K/UL (ref 0–0.01)
NRBC BLD-RTO: 0 PER 100 WBC
P-R INTERVAL, ECG05: 196 MS
PH UR STRIP: 6 [PH] (ref 5–8)
PLATELET # BLD AUTO: 264 K/UL (ref 150–400)
PMV BLD AUTO: 9.3 FL (ref 8.9–12.9)
POTASSIUM SERPL-SCNC: 4.1 MMOL/L (ref 3.5–5.1)
PROT UR STRIP-MCNC: NEGATIVE MG/DL
PROTHROMBIN TIME: 11.9 SEC (ref 9–11.1)
Q-T INTERVAL, ECG07: 416 MS
QRS DURATION, ECG06: 78 MS
QTC CALCULATION (BEZET), ECG08: 425 MS
RBC # BLD AUTO: 3.89 M/UL (ref 3.8–5.2)
RBC #/AREA URNS HPF: NORMAL /HPF (ref 0–5)
SODIUM SERPL-SCNC: 141 MMOL/L (ref 136–145)
SP GR UR REFRACTOMETRY: 1.02 (ref 1–1.03)
SPECIMEN EXP DATE BLD: NORMAL
UA: UC IF INDICATED,UAUC: NORMAL
UROBILINOGEN UR QL STRIP.AUTO: 0.2 EU/DL (ref 0.2–1)
VENTRICULAR RATE, ECG03: 63 BPM
WBC # BLD AUTO: 9.3 K/UL (ref 3.6–11)
WBC URNS QL MICRO: NORMAL /HPF (ref 0–4)

## 2021-02-10 PROCEDURE — 81001 URINALYSIS AUTO W/SCOPE: CPT

## 2021-02-10 PROCEDURE — 93005 ELECTROCARDIOGRAM TRACING: CPT

## 2021-02-10 PROCEDURE — 80048 BASIC METABOLIC PNL TOTAL CA: CPT

## 2021-02-10 PROCEDURE — 85610 PROTHROMBIN TIME: CPT

## 2021-02-10 PROCEDURE — 36415 COLL VENOUS BLD VENIPUNCTURE: CPT

## 2021-02-10 PROCEDURE — 86901 BLOOD TYPING SEROLOGIC RH(D): CPT

## 2021-02-10 PROCEDURE — 83036 HEMOGLOBIN GLYCOSYLATED A1C: CPT

## 2021-02-10 PROCEDURE — 85027 COMPLETE CBC AUTOMATED: CPT

## 2021-02-10 RX ORDER — BISMUTH SUBSALICYLATE 262 MG
1 TABLET,CHEWABLE ORAL DAILY
COMMUNITY

## 2021-02-10 NOTE — PERIOP NOTES
PAT Nurse Practitioner   Pre-Operative Chart Review/Assessment:-ORTHOPEDIC                Patient Name:  Bushra Tran                                                           Age:   68 y.o.    :  1940     Today's Date:  2021     Date of PAT:   2/10/2021      Date of Surgery:    2021      Procedure(s):  Right Total Hip Arthroplasty REVISION     Surgeon:   Dr. Cuca Mayorga                       PLAN:      1)  Medical Clearance:  Dr. Sarah Magallanes      2)  Cardiac Clearance:  Not requested. 3)  Diabetic Treatment Consult:  Not indicated. A1c-5.6      4)  Sleep Apnea evaluation:   Not indicated. LENNOX Score 1.       5) Treatment for MRSA/Staph Aureus:  Neg      6) Additional Concerns:  Dementia                Vital Signs:         Vitals:    02/10/21 1146   BP: 135/79   Pulse: 69   Temp: 98.2 °F (36.8 °C)   Weight: 58.2 kg (128 lb 4.9 oz)   Height: 5' 1\" (1.549 m)            ____________________________________________  PAST MEDICAL HISTORY  Past Medical History:   Diagnosis Date    Arthritis     Dementia ([address]     History of motion sickness       ____________________________________________  PAST SURGICAL HISTORY  Past Surgical History:   Procedure Laterality Date    HX APPENDECTOMY      AS A CHILD    HX COLONOSCOPY      HX HEENT      AS A CHILD    HX HIP REPLACEMENT Right       ____________________________________________  HOME MEDICATIONS  Current Outpatient Medications   Medication Sig    multivitamin (ONE A DAY) tablet Take 1 Tab by mouth daily.  OTHER Indications: CALCIUM GUMMIES 1 DAILY    memantine (NAMENDA) 10 mg tablet Take 1 Tab by mouth two (2) times a day.  donepezil (ARICEPT) 10 mg tablet Take 5 mg by mouth nightly.  citalopram (CELEXA) 20 mg tablet Take 1 Tab by mouth daily.  pravastatin (PRAVACHOL) 20 mg tablet Take 20 mg by mouth nightly. No current facility-administered medications for this encounter. ____________________________________________  ALLERGIES  No Known Allergies   ____________________________________________  SOCIAL HISTORY  Social History     Tobacco Use    Smoking status: Never Smoker    Smokeless tobacco: Never Used   Substance Use Topics    Alcohol use: Yes     Frequency: Never     Comment: 4-6 glasses wine      ____________________________________________        Labs:     Hospital Outpatient Visit on 02/10/2021   Component Date Value Ref Range Status    Sodium 02/10/2021 141  136 - 145 mmol/L Final    Potassium 02/10/2021 4.1  3.5 - 5.1 mmol/L Final    Chloride 02/10/2021 108  97 - 108 mmol/L Final    CO2 02/10/2021 28  21 - 32 mmol/L Final    Anion gap 02/10/2021 5  5 - 15 mmol/L Final    Glucose 02/10/2021 84  65 - 100 mg/dL Final    BUN 02/10/2021 19  6 - 20 MG/DL Final    Creatinine 02/10/2021 0.74  0.55 - 1.02 MG/DL Final    BUN/Creatinine ratio 02/10/2021 26* 12 - 20   Final    GFR est AA 02/10/2021 >60  >60 ml/min/1.73m2 Final    GFR est non-AA 02/10/2021 >60  >60 ml/min/1.73m2 Final    Estimated GFR is calculated using the IDMS-traceable Modification of Diet in Renal Disease (MDRD) Study equation, reported for both  Americans (GFRAA) and non- Americans (GFRNA), and normalized to 1.73m2 body surface area. The physician must decide which value applies to the patient.     Calcium 02/10/2021 9.5  8.5 - 10.1 MG/DL Final    WBC 02/10/2021 9.3  3.6 - 11.0 K/uL Final    RBC 02/10/2021 3.89  3.80 - 5.20 M/uL Final    HGB 02/10/2021 12.8  11.5 - 16.0 g/dL Final    HCT 02/10/2021 38.6  35.0 - 47.0 % Final    MCV 02/10/2021 99.2* 80.0 - 99.0 FL Final    MCH 02/10/2021 32.9  26.0 - 34.0 PG Final    MCHC 02/10/2021 33.2  30.0 - 36.5 g/dL Final    RDW 02/10/2021 12.6  11.5 - 14.5 % Final    PLATELET 93/14/9593 034  150 - 400 K/uL Final    MPV 02/10/2021 9.3  8.9 - 12.9 FL Final    NRBC 02/10/2021 0.0  0  WBC Final    ABSOLUTE NRBC 02/10/2021 0.00  0.00 - 0.01 K/uL Final    Crossmatch Expiration 02/10/2021 02/19/2021,2359   Final    ABO/Rh(D) 02/10/2021 O POSITIVE   Final    Antibody screen 02/10/2021 NEG   Final    INR 02/10/2021 1.1  0.9 - 1.1   Final    Comment: A single therapeutic range for Vit K antagonists may not be optimal for all indications - see June, 2008 issue of Chest, American College of Chest Physicians Evidence-Based Clinical Practice Guidelines, 8th Edition. Instrument and technical errors have been ruled out      Prothrombin time 02/10/2021 11.9* 9.0 - 11.1 sec Final    Instrument and technical errors have been ruled out    Color 02/10/2021 YELLOW/STRAW    Final    Color Reference Range: Straw, Yellow or Dark Yellow    Appearance 02/10/2021 CLEAR  CLEAR   Final    Specific gravity 02/10/2021 1.020  1.003 - 1.030   Final    pH (UA) 02/10/2021 6.0  5.0 - 8.0   Final    Protein 02/10/2021 Negative  NEG mg/dL Final    Glucose 02/10/2021 Negative  NEG mg/dL Final    Ketone 02/10/2021 Negative  NEG mg/dL Final    Bilirubin 02/10/2021 Negative  NEG   Final    Blood 02/10/2021 Negative  NEG   Final    Urobilinogen 02/10/2021 0.2  0.2 - 1.0 EU/dL Final    Nitrites 02/10/2021 Negative  NEG   Final    Leukocyte Esterase 02/10/2021 Negative  NEG   Final    UA:UC IF INDICATED 02/10/2021 CULTURE NOT INDICATED BY UA RESULT  CNI   Final    WBC 02/10/2021 0-4  0 - 4 /hpf Final    RBC 02/10/2021 0-5  0 - 5 /hpf Final    Epithelial cells 02/10/2021 FEW  FEW /lpf Final    Epithelial cell category consists of squamous cells and /or transitional urothelial cells. Renal tubular cells, if present, are separately identified as such.     Bacteria 02/10/2021 Negative  NEG /hpf Final    Hyaline cast 02/10/2021 0-2  0 - 5 /lpf Final    Ventricular Rate 02/10/2021 63  BPM Final    Atrial Rate 02/10/2021 63  BPM Final    P-R Interval 02/10/2021 196  ms Final    QRS Duration 02/10/2021 78  ms Final    Q-T Interval 02/10/2021 416  ms Final    QTC Calculation (Bezet) 02/10/2021 425  ms Final    Calculated P Axis 02/10/2021 35  degrees Final    Calculated R Axis 02/10/2021 10  degrees Final    Calculated T Axis 02/10/2021 45  degrees Final    Diagnosis 02/10/2021    Final                    Value:Normal sinus rhythm  Cannot rule out Anterior infarct , age undetermined  Abnormal ECG  No previous ECGs available  Confirmed by Jagruti Xavier MD, Pablo Pan ([id number]) on 2/10/2021 2:21:25 PM      Hemoglobin A1c 02/10/2021 5.6  4.0 - 5.6 % Final    Comment: NEW METHOD  PLEASE NOTE NEW REFERENCE RANGE  (NOTE)  HbA1C Interpretive Ranges  <5.7              Normal  5.7 - 6.4         Consider Prediabetes  >6.5              Consider Diabetes      Est. average glucose 02/10/2021 114  mg/dL Final    Special Requests: 02/10/2021 NO SPECIAL REQUESTS    Final    Culture result: 02/10/2021 MRSA NOT PRESENT    Final       Skin:     Denies open wounds, cuts, sores, rashes or other areas of concern in PAT assessment.           Skip Him, NP

## 2021-02-11 LAB
BACTERIA SPEC CULT: NORMAL
BACTERIA SPEC CULT: NORMAL
SERVICE CMNT-IMP: NORMAL

## 2021-02-11 NOTE — PERIOP NOTES
EKG AND LAB RESULTS FAXED TO DR. Marianna Briscoe OFFICE VIA CC.    Lorena Schreiber NP OK'D EKG FOR SURGERY

## 2021-02-12 ENCOUNTER — TRANSCRIBE ORDER (OUTPATIENT)
Dept: REGISTRATION | Age: 81
End: 2021-02-12

## 2021-02-12 ENCOUNTER — HOSPITAL ENCOUNTER (OUTPATIENT)
Dept: PREADMISSION TESTING | Age: 81
Discharge: HOME | End: 2021-02-12
Payer: MEDICARE

## 2021-02-12 DIAGNOSIS — Z01.812 PRE-PROCEDURE LAB EXAM: ICD-10-CM

## 2021-02-12 DIAGNOSIS — Z01.812 PRE-PROCEDURE LAB EXAM: Primary | ICD-10-CM

## 2021-02-12 PROCEDURE — U0003 INFECTIOUS AGENT DETECTION BY NUCLEIC ACID (DNA OR RNA); SEVERE ACUTE RESPIRATORY SYNDROME CORONAVIRUS 2 (SARS-COV-2) (CORONAVIRUS DISEASE [COVID-19]), AMPLIFIED PROBE TECHNIQUE, MAKING USE OF HIGH THROUGHPUT TECHNOLOGIES AS DESCRIBED BY CMS-2020-01-R: HCPCS

## 2021-02-13 LAB — SARS-COV-2, COV2NT: NOT DETECTED

## 2021-02-15 ENCOUNTER — ANESTHESIA EVENT (OUTPATIENT)
Dept: SURGERY | Age: 81
DRG: 468 | End: 2021-02-15
Payer: MEDICARE

## 2021-02-15 RX ORDER — CELECOXIB 200 MG/1
200 CAPSULE ORAL ONCE
Status: CANCELLED | OUTPATIENT
Start: 2021-02-15 | End: 2021-02-15

## 2021-02-15 RX ORDER — ACETAMINOPHEN 500 MG
1000 TABLET ORAL ONCE
Status: CANCELLED | OUTPATIENT
Start: 2021-02-15 | End: 2021-02-15

## 2021-02-15 NOTE — DISCHARGE INSTRUCTIONS
Post-op Discharge Instructions Following Total Joint Replacement  Queta Lyles MD  [address]  [phone], ext [phone]  Follow-up Office Visit   See Dr. Sherryle Pollard approximately 3-4 weeks from date of surgery. Call [phone], ext [phone] to make an appointment. Activity   Use your walker for ambulation. Weight bearing as tolerated unless instructed otherwise by the physical therapist. Get up every hour you are awake and take a brief walk. Lengthen walking distance daily as your strength improves.  Continue using your walker until seen in the office for your first follow up visit.  Practice your exercises 3 times daily as instructed by the physical therapist. Nya Marquez for 20 minutes after exercising.  No driving until seen in the office for your first follow up visit. Incision Care   The light brown Aquacel surgical dressing is waterproof and is to remain on your incision for 7 days. On the 7th day, carefully lift the edge of the dressing to break the adhesive seal and gently peel it off.  If your Aquacel dressings comes loose or falls off before the 7th day, replace it with a dry sterile gauze dressing and change this dressing daily. Once there is no drainage on the bandage, you mean leave the incision open to air.  You may take a shower with the Aquacel dressing in place. After you remove the Aquacel dressing on day 7, you may continue to shower and get your incision wet in the shower. Do not submerge your incision under water in a bathtub, hot tub, swimming pool, etc. until after you have been evaluated at your first office visit. Medications   Blood Clot Prevention: Take medication as prescribed by your physician for 4 weeks postop.  Pain Management: Take pain medication as prescribed; wean yourself off of pain medication as your pain lessens. Take with food.  You make also take Tylenol every 4-6 hours as needed for pain. Do not exceed 3 grams (3000mg) per day.    Place an ice bag on or around the incision for 20 minutes on / 20 minutes off as needed throughout the day and night, especially after exercising.  Stool Softener: You may want to take a stool softener (such as Senokot-S or Colace) to prevent constipation while taking pain medication. If constipation occurs, you may also use a laxative (such as Dulcolax tablets, Miralax, or a suppository). Diet   Resume usual diet at home. Drink plenty of fluids. Eat foods high in fiber and protein. Calcium and Vitamin D supplements recommended. Avoid alcoholic beverages. No smoking. When to call your Orthopaedic Surgeon: If you call after 5pm or on a weekend, the on call physician will return your call   Pain that is not relieved by pain medication, ice, and activity modification   Signs of infection (red incision, continuous drainage from the incision, malodorous drainage, persistent fever greater than 101 degrees Fahrenheit)   Signs of a blood clot in your leg (calf pain, tenderness, redness, and/or swelling of the lower leg)  ?   When to call your Primary Care Physician   Concerns about your medical conditions such as diabetes, high blood pressure, asthma, congestive heart failure   Call if blood sugars are elevated, if you have a persistent headache or dizziness, coughing or congestion, constipation or diarrhea, burning with urination, abnormal heart rate (fast or slow)  When to call 911 and go to the nearest Emergency Room   Acute onset of chest pain, shortness of breath, difficulty breathing

## 2021-02-15 NOTE — ADDENDUM NOTE
Addended by: Renita Lpoez on: 2/15/2021 11:34 AM     Modules accepted: Orders Phone message printed and placed on Dr. Smyth's desk for review.

## 2021-02-16 ENCOUNTER — ANESTHESIA (OUTPATIENT)
Dept: SURGERY | Age: 81
DRG: 468 | End: 2021-02-16
Payer: MEDICARE

## 2021-02-16 ENCOUNTER — APPOINTMENT (OUTPATIENT)
Dept: GENERAL RADIOLOGY | Age: 81
DRG: 468 | End: 2021-02-16
Attending: PHYSICIAN ASSISTANT
Payer: MEDICARE

## 2021-02-16 ENCOUNTER — HOSPITAL ENCOUNTER (INPATIENT)
Age: 81
LOS: 1 days | Discharge: HOME HEALTH | DRG: 468 | End: 2021-02-17
Attending: ORTHOPAEDIC SURGERY | Admitting: ORTHOPAEDIC SURGERY
Payer: MEDICARE

## 2021-02-16 DIAGNOSIS — T84.060A WEAR OF ARTICULAR BEARING SURFACE OF INTERNAL PROSTHETIC RIGHT HIP JOINT, INITIAL ENCOUNTER (HCC): Primary | ICD-10-CM

## 2021-02-16 PROBLEM — Z96.641 HISTORY OF RIGHT HIP REPLACEMENT: Status: ACTIVE | Noted: 2021-02-16

## 2021-02-16 LAB
GLUCOSE BLD STRIP.AUTO-MCNC: 99 MG/DL (ref 65–100)
SERVICE CMNT-IMP: NORMAL

## 2021-02-16 PROCEDURE — 87205 SMEAR GRAM STAIN: CPT

## 2021-02-16 PROCEDURE — 74011000250 HC RX REV CODE- 250: Performed by: ANESTHESIOLOGY

## 2021-02-16 PROCEDURE — C1776 JOINT DEVICE (IMPLANTABLE): HCPCS | Performed by: ORTHOPAEDIC SURGERY

## 2021-02-16 PROCEDURE — 77030010507 HC ADH SKN DERMBND J&J -B: Performed by: ORTHOPAEDIC SURGERY

## 2021-02-16 PROCEDURE — 77030005513 HC CATH URETH FOL11 MDII -B: Performed by: ORTHOPAEDIC SURGERY

## 2021-02-16 PROCEDURE — 74011250636 HC RX REV CODE- 250/636: Performed by: ANESTHESIOLOGY

## 2021-02-16 PROCEDURE — 97161 PT EVAL LOW COMPLEX 20 MIN: CPT

## 2021-02-16 PROCEDURE — 77030033067 HC SUT PDO STRATFX SPIR J&J -B: Performed by: ORTHOPAEDIC SURGERY

## 2021-02-16 PROCEDURE — 74011000250 HC RX REV CODE- 250: Performed by: ORTHOPAEDIC SURGERY

## 2021-02-16 PROCEDURE — 0SUA09Z SUPPLEMENT RIGHT HIP JOINT, ACETABULAR SURFACE WITH LINER, OPEN APPROACH: ICD-10-PCS | Performed by: ORTHOPAEDIC SURGERY

## 2021-02-16 PROCEDURE — 77030002933 HC SUT MCRYL J&J -A: Performed by: ORTHOPAEDIC SURGERY

## 2021-02-16 PROCEDURE — 87075 CULTR BACTERIA EXCEPT BLOOD: CPT

## 2021-02-16 PROCEDURE — 74011250637 HC RX REV CODE- 250/637: Performed by: PHYSICIAN ASSISTANT

## 2021-02-16 PROCEDURE — 77030003666 HC NDL SPINAL BD -A: Performed by: ANESTHESIOLOGY

## 2021-02-16 PROCEDURE — 87070 CULTURE OTHR SPECIMN AEROBIC: CPT

## 2021-02-16 PROCEDURE — 77030040922 HC BLNKT HYPOTHRM STRY -A

## 2021-02-16 PROCEDURE — 74011250637 HC RX REV CODE- 250/637: Performed by: ORTHOPAEDIC SURGERY

## 2021-02-16 PROCEDURE — 74011000258 HC RX REV CODE- 258: Performed by: NURSE ANESTHETIST, CERTIFIED REGISTERED

## 2021-02-16 PROCEDURE — 77030018673: Performed by: ORTHOPAEDIC SURGERY

## 2021-02-16 PROCEDURE — 76010000172 HC OR TIME 2.5 TO 3 HR INTENSV-TIER 1: Performed by: ORTHOPAEDIC SURGERY

## 2021-02-16 PROCEDURE — 2709999900 HC NON-CHARGEABLE SUPPLY: Performed by: ORTHOPAEDIC SURGERY

## 2021-02-16 PROCEDURE — 77030018074 HC RTVR SUT ARTH4 S&N -B: Performed by: ORTHOPAEDIC SURGERY

## 2021-02-16 PROCEDURE — 77030006835 HC BLD SAW SAG STRY -B: Performed by: ORTHOPAEDIC SURGERY

## 2021-02-16 PROCEDURE — 77030014125 HC TY EPDRL BBMI -B: Performed by: ANESTHESIOLOGY

## 2021-02-16 PROCEDURE — 77030041397 HC DRSG PRIMASEAL AG MDII -B: Performed by: ORTHOPAEDIC SURGERY

## 2021-02-16 PROCEDURE — 74011000258 HC RX REV CODE- 258: Performed by: ORTHOPAEDIC SURGERY

## 2021-02-16 PROCEDURE — 77030006784 HC BLD SAW OSC MCRA -B: Performed by: ORTHOPAEDIC SURGERY

## 2021-02-16 PROCEDURE — 77030027138 HC INCENT SPIROMETER -A

## 2021-02-16 PROCEDURE — 74011000250 HC RX REV CODE- 250: Performed by: PHYSICIAN ASSISTANT

## 2021-02-16 PROCEDURE — 87176 TISSUE HOMOGENIZATION CULTR: CPT

## 2021-02-16 PROCEDURE — 76060000036 HC ANESTHESIA 2.5 TO 3 HR: Performed by: ORTHOPAEDIC SURGERY

## 2021-02-16 PROCEDURE — 74011250636 HC RX REV CODE- 250/636: Performed by: PHYSICIAN ASSISTANT

## 2021-02-16 PROCEDURE — 97116 GAIT TRAINING THERAPY: CPT

## 2021-02-16 PROCEDURE — 76210000017 HC OR PH I REC 1.5 TO 2 HR: Performed by: ORTHOPAEDIC SURGERY

## 2021-02-16 PROCEDURE — 82962 GLUCOSE BLOOD TEST: CPT

## 2021-02-16 PROCEDURE — 74011000250 HC RX REV CODE- 250: Performed by: NURSE ANESTHETIST, CERTIFIED REGISTERED

## 2021-02-16 PROCEDURE — 77030036660

## 2021-02-16 PROCEDURE — 2709999900 HC NON-CHARGEABLE SUPPLY

## 2021-02-16 PROCEDURE — 97530 THERAPEUTIC ACTIVITIES: CPT

## 2021-02-16 PROCEDURE — 77030018547 HC SUT ETHBND1 J&J -B: Performed by: ORTHOPAEDIC SURGERY

## 2021-02-16 PROCEDURE — 77030003882 HC BIT DRL TWST BRSM -B: Performed by: ORTHOPAEDIC SURGERY

## 2021-02-16 PROCEDURE — 0SRR03Z REPLACEMENT OF RIGHT HIP JOINT, FEMORAL SURFACE WITH CERAMIC SYNTHETIC SUBSTITUTE, OPEN APPROACH: ICD-10-PCS | Performed by: ORTHOPAEDIC SURGERY

## 2021-02-16 PROCEDURE — 77030031139 HC SUT VCRL2 J&J -A: Performed by: ORTHOPAEDIC SURGERY

## 2021-02-16 PROCEDURE — 73501 X-RAY EXAM HIP UNI 1 VIEW: CPT

## 2021-02-16 PROCEDURE — 0SPR0JZ REMOVAL OF SYNTHETIC SUBSTITUTE FROM RIGHT HIP JOINT, FEMORAL SURFACE, OPEN APPROACH: ICD-10-PCS | Performed by: ORTHOPAEDIC SURGERY

## 2021-02-16 PROCEDURE — 74011250636 HC RX REV CODE- 250/636: Performed by: NURSE ANESTHETIST, CERTIFIED REGISTERED

## 2021-02-16 PROCEDURE — 74011250636 HC RX REV CODE- 250/636: Performed by: ORTHOPAEDIC SURGERY

## 2021-02-16 PROCEDURE — 3E0T3BZ INTRODUCTION OF ANESTHETIC AGENT INTO PERIPHERAL NERVES AND PLEXI, PERCUTANEOUS APPROACH: ICD-10-PCS | Performed by: ANESTHESIOLOGY

## 2021-02-16 PROCEDURE — 0SP909Z REMOVAL OF LINER FROM RIGHT HIP JOINT, OPEN APPROACH: ICD-10-PCS | Performed by: ORTHOPAEDIC SURGERY

## 2021-02-16 DEVICE — BIOLOX DELTA TS CERAMIC FEMORAL HEAD 12/14 TAPER REVISION DIAMETER 36MM +1.5
Type: IMPLANTABLE DEVICE | Site: HIP | Status: FUNCTIONAL
Brand: BIOLOX DELTA

## 2021-02-16 DEVICE — PINNACLE HIP SOLUTIONS ALTRX POLYETHYLENE ACETABULAR LINER +4 10 DEGREE 36MM ID 52MM OD
Type: IMPLANTABLE DEVICE | Site: HIP | Status: FUNCTIONAL
Brand: PINNACLE ALTRX

## 2021-02-16 RX ORDER — SODIUM CHLORIDE 0.9 % (FLUSH) 0.9 %
5-40 SYRINGE (ML) INJECTION AS NEEDED
Status: DISCONTINUED | OUTPATIENT
Start: 2021-02-16 | End: 2021-02-16 | Stop reason: HOSPADM

## 2021-02-16 RX ORDER — DEXAMETHASONE SODIUM PHOSPHATE 4 MG/ML
INJECTION, SOLUTION INTRA-ARTICULAR; INTRALESIONAL; INTRAMUSCULAR; INTRAVENOUS; SOFT TISSUE AS NEEDED
Status: DISCONTINUED | OUTPATIENT
Start: 2021-02-16 | End: 2021-02-16

## 2021-02-16 RX ORDER — SODIUM CHLORIDE 0.9 % (FLUSH) 0.9 %
5-40 SYRINGE (ML) INJECTION EVERY 8 HOURS
Status: DISCONTINUED | OUTPATIENT
Start: 2021-02-16 | End: 2021-02-16 | Stop reason: HOSPADM

## 2021-02-16 RX ORDER — AMOXICILLIN 250 MG
1 CAPSULE ORAL 2 TIMES DAILY
Status: DISCONTINUED | OUTPATIENT
Start: 2021-02-16 | End: 2021-02-17 | Stop reason: HOSPADM

## 2021-02-16 RX ORDER — ASPIRIN 81 MG/1
81 TABLET ORAL 2 TIMES DAILY
Qty: 60 TAB | Refills: 0 | Status: SHIPPED | OUTPATIENT
Start: 2021-02-16 | End: 2022-03-15

## 2021-02-16 RX ORDER — OXYCODONE HYDROCHLORIDE 5 MG/1
5 TABLET ORAL
Status: DISCONTINUED | OUTPATIENT
Start: 2021-02-16 | End: 2021-02-17 | Stop reason: HOSPADM

## 2021-02-16 RX ORDER — SODIUM CHLORIDE 9 MG/ML
125 INJECTION, SOLUTION INTRAVENOUS CONTINUOUS
Status: DISCONTINUED | OUTPATIENT
Start: 2021-02-16 | End: 2021-02-17 | Stop reason: HOSPADM

## 2021-02-16 RX ORDER — NALOXONE HYDROCHLORIDE 0.4 MG/ML
0.4 INJECTION, SOLUTION INTRAMUSCULAR; INTRAVENOUS; SUBCUTANEOUS AS NEEDED
Status: DISCONTINUED | OUTPATIENT
Start: 2021-02-16 | End: 2021-02-17 | Stop reason: HOSPADM

## 2021-02-16 RX ORDER — PRAVASTATIN SODIUM 20 MG/1
20 TABLET ORAL
Status: DISCONTINUED | OUTPATIENT
Start: 2021-02-16 | End: 2021-02-17 | Stop reason: HOSPADM

## 2021-02-16 RX ORDER — ONDANSETRON 2 MG/ML
4 INJECTION INTRAMUSCULAR; INTRAVENOUS AS NEEDED
Status: DISCONTINUED | OUTPATIENT
Start: 2021-02-16 | End: 2021-02-16 | Stop reason: HOSPADM

## 2021-02-16 RX ORDER — SODIUM CHLORIDE, SODIUM LACTATE, POTASSIUM CHLORIDE, CALCIUM CHLORIDE 600; 310; 30; 20 MG/100ML; MG/100ML; MG/100ML; MG/100ML
125 INJECTION, SOLUTION INTRAVENOUS CONTINUOUS
Status: DISCONTINUED | OUTPATIENT
Start: 2021-02-16 | End: 2021-02-16 | Stop reason: HOSPADM

## 2021-02-16 RX ORDER — DIPHENHYDRAMINE HYDROCHLORIDE 50 MG/ML
12.5 INJECTION, SOLUTION INTRAMUSCULAR; INTRAVENOUS AS NEEDED
Status: DISCONTINUED | OUTPATIENT
Start: 2021-02-16 | End: 2021-02-16 | Stop reason: HOSPADM

## 2021-02-16 RX ORDER — MIDAZOLAM HYDROCHLORIDE 1 MG/ML
0.5 INJECTION, SOLUTION INTRAMUSCULAR; INTRAVENOUS
Status: DISCONTINUED | OUTPATIENT
Start: 2021-02-16 | End: 2021-02-16 | Stop reason: HOSPADM

## 2021-02-16 RX ORDER — FENTANYL CITRATE 50 UG/ML
25 INJECTION, SOLUTION INTRAMUSCULAR; INTRAVENOUS
Status: DISCONTINUED | OUTPATIENT
Start: 2021-02-16 | End: 2021-02-16 | Stop reason: HOSPADM

## 2021-02-16 RX ORDER — FENTANYL CITRATE 50 UG/ML
50 INJECTION, SOLUTION INTRAMUSCULAR; INTRAVENOUS AS NEEDED
Status: DISCONTINUED | OUTPATIENT
Start: 2021-02-16 | End: 2021-02-16 | Stop reason: HOSPADM

## 2021-02-16 RX ORDER — SODIUM CHLORIDE 9 MG/ML
25 INJECTION, SOLUTION INTRAVENOUS CONTINUOUS
Status: DISCONTINUED | OUTPATIENT
Start: 2021-02-16 | End: 2021-02-16 | Stop reason: HOSPADM

## 2021-02-16 RX ORDER — MIDAZOLAM HYDROCHLORIDE 1 MG/ML
INJECTION, SOLUTION INTRAMUSCULAR; INTRAVENOUS AS NEEDED
Status: DISCONTINUED | OUTPATIENT
Start: 2021-02-16 | End: 2021-02-16 | Stop reason: HOSPADM

## 2021-02-16 RX ORDER — SODIUM CHLORIDE 0.9 % (FLUSH) 0.9 %
5-40 SYRINGE (ML) INJECTION AS NEEDED
Status: DISCONTINUED | OUTPATIENT
Start: 2021-02-16 | End: 2021-02-17 | Stop reason: HOSPADM

## 2021-02-16 RX ORDER — PROPOFOL 10 MG/ML
INJECTION, EMULSION INTRAVENOUS AS NEEDED
Status: DISCONTINUED | OUTPATIENT
Start: 2021-02-16 | End: 2021-02-16 | Stop reason: HOSPADM

## 2021-02-16 RX ORDER — HYDROMORPHONE HYDROCHLORIDE 1 MG/ML
0.2 INJECTION, SOLUTION INTRAMUSCULAR; INTRAVENOUS; SUBCUTANEOUS
Status: DISCONTINUED | OUTPATIENT
Start: 2021-02-16 | End: 2021-02-16 | Stop reason: HOSPADM

## 2021-02-16 RX ORDER — PHENYLEPHRINE HCL IN 0.9% NACL 0.4MG/10ML
SYRINGE (ML) INTRAVENOUS AS NEEDED
Status: DISCONTINUED | OUTPATIENT
Start: 2021-02-16 | End: 2021-02-16 | Stop reason: HOSPADM

## 2021-02-16 RX ORDER — ACETAMINOPHEN 500 MG
500 TABLET ORAL
Status: DISCONTINUED | OUTPATIENT
Start: 2021-02-16 | End: 2021-02-17 | Stop reason: HOSPADM

## 2021-02-16 RX ORDER — AMOXICILLIN 250 MG
1 CAPSULE ORAL
Qty: 60 TAB | Refills: 0 | Status: SHIPPED | OUTPATIENT
Start: 2021-02-16 | End: 2022-03-15

## 2021-02-16 RX ORDER — DONEPEZIL HYDROCHLORIDE 5 MG/1
5 TABLET, FILM COATED ORAL
Status: DISCONTINUED | OUTPATIENT
Start: 2021-02-16 | End: 2021-02-17 | Stop reason: HOSPADM

## 2021-02-16 RX ORDER — ACETAMINOPHEN 500 MG
500 TABLET ORAL EVERY 4 HOURS
Qty: 100 TAB | Refills: 0 | Status: SHIPPED
Start: 2021-02-16

## 2021-02-16 RX ORDER — OXYCODONE AND ACETAMINOPHEN 5; 325 MG/1; MG/1
1 TABLET ORAL AS NEEDED
Status: DISCONTINUED | OUTPATIENT
Start: 2021-02-16 | End: 2021-02-16 | Stop reason: HOSPADM

## 2021-02-16 RX ORDER — ACETAMINOPHEN 500 MG
1000 TABLET ORAL ONCE
Status: COMPLETED | OUTPATIENT
Start: 2021-02-16 | End: 2021-02-16

## 2021-02-16 RX ORDER — KETOROLAC TROMETHAMINE 30 MG/ML
15 INJECTION, SOLUTION INTRAMUSCULAR; INTRAVENOUS EVERY 6 HOURS
Status: DISCONTINUED | OUTPATIENT
Start: 2021-02-16 | End: 2021-02-17 | Stop reason: HOSPADM

## 2021-02-16 RX ORDER — SODIUM CHLORIDE 0.9 % (FLUSH) 0.9 %
5-40 SYRINGE (ML) INJECTION EVERY 8 HOURS
Status: DISCONTINUED | OUTPATIENT
Start: 2021-02-16 | End: 2021-02-17 | Stop reason: HOSPADM

## 2021-02-16 RX ORDER — LIDOCAINE HYDROCHLORIDE 10 MG/ML
0.1 INJECTION, SOLUTION EPIDURAL; INFILTRATION; INTRACAUDAL; PERINEURAL AS NEEDED
Status: DISCONTINUED | OUTPATIENT
Start: 2021-02-16 | End: 2021-02-16 | Stop reason: HOSPADM

## 2021-02-16 RX ORDER — DEXAMETHASONE SODIUM PHOSPHATE 4 MG/ML
INJECTION, SOLUTION INTRA-ARTICULAR; INTRALESIONAL; INTRAMUSCULAR; INTRAVENOUS; SOFT TISSUE AS NEEDED
Status: DISCONTINUED | OUTPATIENT
Start: 2021-02-16 | End: 2021-02-16 | Stop reason: HOSPADM

## 2021-02-16 RX ORDER — ONDANSETRON 2 MG/ML
INJECTION INTRAMUSCULAR; INTRAVENOUS AS NEEDED
Status: DISCONTINUED | OUTPATIENT
Start: 2021-02-16 | End: 2021-02-16 | Stop reason: HOSPADM

## 2021-02-16 RX ORDER — OXYCODONE HYDROCHLORIDE 5 MG/1
2.5-5 TABLET ORAL
Qty: 42 TAB | Refills: 0 | Status: SHIPPED | OUTPATIENT
Start: 2021-02-16 | End: 2021-02-23

## 2021-02-16 RX ORDER — MORPHINE SULFATE 10 MG/ML
2 INJECTION, SOLUTION INTRAMUSCULAR; INTRAVENOUS
Status: DISCONTINUED | OUTPATIENT
Start: 2021-02-16 | End: 2021-02-16 | Stop reason: HOSPADM

## 2021-02-16 RX ORDER — MEMANTINE HYDROCHLORIDE 10 MG/1
10 TABLET ORAL 2 TIMES DAILY
Status: DISCONTINUED | OUTPATIENT
Start: 2021-02-16 | End: 2021-02-17 | Stop reason: HOSPADM

## 2021-02-16 RX ORDER — FACIAL-BODY WIPES
10 EACH TOPICAL DAILY PRN
Status: DISCONTINUED | OUTPATIENT
Start: 2021-02-17 | End: 2021-02-17 | Stop reason: HOSPADM

## 2021-02-16 RX ORDER — BUPIVACAINE HYDROCHLORIDE 5 MG/ML
INJECTION, SOLUTION EPIDURAL; INTRACAUDAL
Status: COMPLETED | OUTPATIENT
Start: 2021-02-16 | End: 2021-02-16

## 2021-02-16 RX ORDER — PROPOFOL 10 MG/ML
INJECTION, EMULSION INTRAVENOUS
Status: DISCONTINUED | OUTPATIENT
Start: 2021-02-16 | End: 2021-02-16 | Stop reason: HOSPADM

## 2021-02-16 RX ORDER — ONDANSETRON 2 MG/ML
4 INJECTION INTRAMUSCULAR; INTRAVENOUS
Status: DISCONTINUED | OUTPATIENT
Start: 2021-02-16 | End: 2021-02-17 | Stop reason: HOSPADM

## 2021-02-16 RX ORDER — POLYETHYLENE GLYCOL 3350 17 G/17G
17 POWDER, FOR SOLUTION ORAL DAILY
Status: DISCONTINUED | OUTPATIENT
Start: 2021-02-17 | End: 2021-02-17 | Stop reason: HOSPADM

## 2021-02-16 RX ORDER — BUPIVACAINE HYDROCHLORIDE 5 MG/ML
INJECTION, SOLUTION EPIDURAL; INTRACAUDAL AS NEEDED
Status: DISCONTINUED | OUTPATIENT
Start: 2021-02-16 | End: 2021-02-16 | Stop reason: HOSPADM

## 2021-02-16 RX ORDER — ASPIRIN 81 MG/1
81 TABLET ORAL 2 TIMES DAILY
Status: DISCONTINUED | OUTPATIENT
Start: 2021-02-16 | End: 2021-02-17 | Stop reason: HOSPADM

## 2021-02-16 RX ORDER — CELECOXIB 200 MG/1
200 CAPSULE ORAL ONCE
Status: COMPLETED | OUTPATIENT
Start: 2021-02-16 | End: 2021-02-16

## 2021-02-16 RX ORDER — HYDROXYZINE HYDROCHLORIDE 10 MG/1
10 TABLET, FILM COATED ORAL
Status: DISCONTINUED | OUTPATIENT
Start: 2021-02-16 | End: 2021-02-17 | Stop reason: HOSPADM

## 2021-02-16 RX ORDER — HYDROMORPHONE HYDROCHLORIDE 1 MG/ML
0.5 INJECTION, SOLUTION INTRAMUSCULAR; INTRAVENOUS; SUBCUTANEOUS
Status: DISCONTINUED | OUTPATIENT
Start: 2021-02-16 | End: 2021-02-17 | Stop reason: HOSPADM

## 2021-02-16 RX ORDER — OXYCODONE HYDROCHLORIDE 5 MG/1
2.5 TABLET ORAL
Status: DISCONTINUED | OUTPATIENT
Start: 2021-02-16 | End: 2021-02-17 | Stop reason: HOSPADM

## 2021-02-16 RX ORDER — CITALOPRAM 20 MG/1
20 TABLET, FILM COATED ORAL DAILY
Status: DISCONTINUED | OUTPATIENT
Start: 2021-02-17 | End: 2021-02-17 | Stop reason: HOSPADM

## 2021-02-16 RX ORDER — MIDAZOLAM HYDROCHLORIDE 1 MG/ML
1 INJECTION, SOLUTION INTRAMUSCULAR; INTRAVENOUS AS NEEDED
Status: DISCONTINUED | OUTPATIENT
Start: 2021-02-16 | End: 2021-02-16 | Stop reason: HOSPADM

## 2021-02-16 RX ORDER — ACETAMINOPHEN 325 MG/1
650 TABLET ORAL ONCE
Status: DISCONTINUED | OUTPATIENT
Start: 2021-02-16 | End: 2021-02-16 | Stop reason: SDUPTHER

## 2021-02-16 RX ADMIN — CEFAZOLIN SODIUM 2 G: 1 INJECTION, POWDER, FOR SOLUTION INTRAMUSCULAR; INTRAVENOUS at 18:26

## 2021-02-16 RX ADMIN — MEMANTINE HYDROCHLORIDE 10 MG: 10 TABLET ORAL at 18:27

## 2021-02-16 RX ADMIN — PROPOFOL 10 MG: 10 INJECTION, EMULSION INTRAVENOUS at 12:43

## 2021-02-16 RX ADMIN — MIDAZOLAM 2 MG: 1 INJECTION INTRAMUSCULAR; INTRAVENOUS at 10:29

## 2021-02-16 RX ADMIN — DOCUSATE SODIUM 50 MG AND SENNOSIDES 8.6 MG 1 TABLET: 8.6; 5 TABLET, FILM COATED ORAL at 18:27

## 2021-02-16 RX ADMIN — TRANEXAMIC ACID 1 G: 100 INJECTION, SOLUTION INTRAVENOUS at 10:51

## 2021-02-16 RX ADMIN — PROPOFOL 40 MCG/KG/MIN: 10 INJECTION, EMULSION INTRAVENOUS at 10:33

## 2021-02-16 RX ADMIN — DEXMEDETOMIDINE HYDROCHLORIDE 8 MCG: 100 INJECTION, SOLUTION, CONCENTRATE INTRAVENOUS at 12:42

## 2021-02-16 RX ADMIN — DONEPEZIL HYDROCHLORIDE 5 MG: 5 TABLET, FILM COATED ORAL at 22:00

## 2021-02-16 RX ADMIN — PHENYLEPHRINE HYDROCHLORIDE 20 MCG/MIN: 10 INJECTION INTRAVENOUS at 10:40

## 2021-02-16 RX ADMIN — DEXAMETHASONE SODIUM PHOSPHATE 8 MG: 4 INJECTION, SOLUTION INTRAMUSCULAR; INTRAVENOUS at 10:57

## 2021-02-16 RX ADMIN — Medication 40 MCG: at 10:40

## 2021-02-16 RX ADMIN — PRAVASTATIN SODIUM 20 MG: 20 TABLET ORAL at 22:00

## 2021-02-16 RX ADMIN — BUPIVACAINE HYDROCHLORIDE 10 MG: 5 INJECTION, SOLUTION EPIDURAL; INTRACAUDAL; PERINEURAL at 10:41

## 2021-02-16 RX ADMIN — ASPIRIN 81 MG: 81 TABLET, COATED ORAL at 18:27

## 2021-02-16 RX ADMIN — KETOROLAC TROMETHAMINE 15 MG: 30 INJECTION, SOLUTION INTRAMUSCULAR at 18:27

## 2021-02-16 RX ADMIN — ACETAMINOPHEN 500 MG: 500 TABLET ORAL at 18:27

## 2021-02-16 RX ADMIN — SODIUM CHLORIDE, POTASSIUM CHLORIDE, SODIUM LACTATE AND CALCIUM CHLORIDE 125 ML/HR: 600; 310; 30; 20 INJECTION, SOLUTION INTRAVENOUS at 09:38

## 2021-02-16 RX ADMIN — ACETAMINOPHEN 1000 MG: 500 TABLET ORAL at 09:35

## 2021-02-16 RX ADMIN — HYDROXYZINE HYDROCHLORIDE 10 MG: 10 TABLET, FILM COATED ORAL at 23:33

## 2021-02-16 RX ADMIN — PROPOFOL 10 MG: 10 INJECTION, EMULSION INTRAVENOUS at 10:37

## 2021-02-16 RX ADMIN — CELECOXIB 200 MG: 200 CAPSULE ORAL at 09:35

## 2021-02-16 RX ADMIN — PROPOFOL 20 MG: 10 INJECTION, EMULSION INTRAVENOUS at 10:36

## 2021-02-16 RX ADMIN — SODIUM CHLORIDE 125 ML/HR: 9 INJECTION, SOLUTION INTRAVENOUS at 13:26

## 2021-02-16 RX ADMIN — WATER 2 G: 1 INJECTION INTRAMUSCULAR; INTRAVENOUS; SUBCUTANEOUS at 10:50

## 2021-02-16 RX ADMIN — Medication 40 MCG: at 10:41

## 2021-02-16 RX ADMIN — ACETAMINOPHEN 500 MG: 500 TABLET ORAL at 22:00

## 2021-02-16 RX ADMIN — ONDANSETRON HYDROCHLORIDE 4 MG: 2 INJECTION, SOLUTION INTRAMUSCULAR; INTRAVENOUS at 11:32

## 2021-02-16 RX ADMIN — Medication 10 ML: at 18:28

## 2021-02-16 RX ADMIN — TRANEXAMIC ACID 1 G: 100 INJECTION, SOLUTION INTRAVENOUS at 10:40

## 2021-02-16 RX ADMIN — SODIUM CHLORIDE, POTASSIUM CHLORIDE, SODIUM LACTATE AND CALCIUM CHLORIDE: 600; 310; 30; 20 INJECTION, SOLUTION INTRAVENOUS at 12:03

## 2021-02-16 NOTE — ANESTHESIA PREPROCEDURE EVALUATION
Relevant Problems   No relevant active problems       Anesthetic History   No history of anesthetic complications            Review of Systems / Medical History  Patient summary reviewed, nursing notes reviewed and pertinent labs reviewed    Pulmonary  Within defined limits                 Neuro/Psych   Within defined limits           Cardiovascular  Within defined limits                Exercise tolerance: >4 METS     GI/Hepatic/Renal  Within defined limits              Endo/Other  Within defined limits      Arthritis     Other Findings   Comments: Dementia  Motion sickness           Physical Exam    Airway  Mallampati: II  TM Distance: > 6 cm  Neck ROM: normal range of motion   Mouth opening: Normal     Cardiovascular  Regular rate and rhythm,  S1 and S2 normal,  no murmur, click, rub, or gallop             Dental  No notable dental hx       Pulmonary  Breath sounds clear to auscultation               Abdominal  GI exam deferred       Other Findings            Anesthetic Plan    ASA: 3  Anesthesia type: spinal and general - backup          Induction: Intravenous  Anesthetic plan and risks discussed with: Patient and Spouse

## 2021-02-16 NOTE — BRIEF OP NOTE
Brief Postoperative Note    Patient: Duran Valadez  YOB: 1940  MRN: [id number]    Date of Procedure: 2/16/2021     Pre-Op Diagnosis: STATUS POST RIGHT TOTAL HIP ARTHROPLASTY, WEAR OF ARTICULAR BEARING SURFACE OF INTERNAL PROSTHETIC RIGHT HIP JOINT    Post-Op Diagnosis: Same as preoperative diagnosis. Procedure(s):  REVISION RIGHT TOTAL HIP ARTHROPLASTY     Surgeon(s):  Noy Schuster MD    Surgical Assistant: Physician Assistant: Camilla Arreaga PA-C  Surg Asst-1: Joshua PAYNE    Anesthesia: Spinal     Estimated Blood Loss (mL): 449     Complications: None    Specimens:   ID Type Source Tests Collected by Time Destination   1 : right hip synovial fluid Joint Fluid Joint, Hip CULTURE, ANAEROBIC, CULTURE, BODY FLUID, Kelly Centeno MD 2/16/2021 1135 Microbiology   2 : right hip synovium #1 Tissue Hip, right CULTURE, ANAEROBIC, CULTURE, TISSUE W Kelly Centeno MD 2/16/2021 1137 Microbiology   3 : right hip synovium #2 Tissue Hip, right CULTURE, ANAEROBIC, CULTURE, TISSUE W Kelly Centeno MD 2/16/2021 1138 Microbiology        Implants:   Implant Name Type Inv.  Item Serial No.  Lot No. LRB No. Used Action   LINER ACET [id number] +4MM OFFSET 10DEG HIP POLYETH MTL - SNA  LINER ACET OD52MM [id number] +4MM OFFSET 10DEG HIP POLYETH MTL NA [employer] [id number] Right 1 Implanted   HEAD FEM [id number] +1.5MM OFFSET 12/14 TAPR HIP CERAMIC TI SL - SNA  HEAD FEM [id number] +1.5MM OFFSET 12/14 TAPR HIP CERAMIC TI SL NA [facility] DE[employer]WD [id number] Right 1 Implanted       Drains: * No LDAs found *    Findings: loose poly    Electronically Signed by Justin Box PA-C on 2/16/2021 at 1:04 PM

## 2021-02-16 NOTE — DISCHARGE SUMMARY
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     HISTORY OF PRESENT ILLNESS: The patient is a 56-year-old female who had sudden-onset right hip pain with crepitus just over a week ago that occurred when she was rising from a chair. She presented the next day for evaluation. Radiographs demonstrate the femoral head and her right hip prosthesis articulating on the inner surface of the acetabular shell. There is a shadow on the AP image that likely represents the dislodged and spun-out polyethylene liner. She presents to the operating room for revision right total hip replacement. Risks, benefits, alternatives of procedure reviewed with her in detail and she desires to proceed. The risks, benefits, alternative were also reviewed with the patient's family.  [address]:  The patient underwent the aforementioned procedure on date of admission under spinal anesthesia with adductor canal block. There were no immediate postoperative complications. She was started on a multimodal pain regimen and DVT prophylaxis.     DISPOSITION: The patient made slow, steady progress with physical therapy and was appropriate for discharge to Home in stable condition on postoperative day 1. DISCHARGE MEDICATIONS:  Reinitiate preadmission medications.   In addition, the patient will be on ASA for DVT prophylaxis and low dose oxycodone and Tylenol for pain.    DISCHARGE INSTRUCTIONS:  Detailed printed instructions were provided to the patient. Follow up with Dr. Perico Rees in approximately 3 weeks. The patient will receive home health physical therapy in the meantime.     Signed by: Celina Calloway PA-C  2/19/2021

## 2021-02-16 NOTE — ANESTHESIA PROCEDURE NOTES
Spinal Block    Start time: 2/16/2021 10:33 AM  End time: 2/16/2021 10:42 AM  Performed by: Darin Field MD  Authorized by: Darin Field MD     Pre-procedure:   Indications: primary anesthetic  Preanesthetic Checklist: patient identified, risks and benefits discussed, anesthesia consent, site marked, patient being monitored and timeout performed    Timeout Time: 10:34          Spinal Block:   Patient Position:  Seated  Prep Region:  Lumbar  Prep: Betadine      Location:  L3-4  Technique:  Single shot        Needle:   Needle Type:  Pencil-tip  Needle Gauge:  25 G  Attempts:  1      Events: CSF confirmed, no blood with aspiration and no paresthesia        Assessment:  Insertion:  Uncomplicated  Patient tolerance:  Patient tolerated the procedure well with no immediate complications

## 2021-02-16 NOTE — PROGRESS NOTES
TRANSFER - IN REPORT:    Verbal report received from Kerin Mortimer, RN(name) on Duran Valadez  being received from PACU (unit) for routine post - op      Report consisted of patients Situation, Background, Assessment and   Recommendations(SBAR). Information from the following report(s) SBAR, Kardex, Procedure Summary, Intake/Output, MAR and Recent Results was reviewed with the receiving nurse. Opportunity for questions and clarification was provided. Assessment completed upon patients arrival to unit and care assumed.

## 2021-02-16 NOTE — ROUTINE PROCESS
Patient: Johnny Nelson MRN: [id number]  SSN: [id number]   YOB: 1940  Age: 68 y.o. Sex: female     Patient is status post Procedure(s):  REVISION RIGHT TOTAL HIP ARTHROPLASTY .     Surgeon(s) and Role:     Rena Chavez MD - Primary    Local/Dose/Irrigation:  100ml surgical pain solution                  Peripheral IV 02/16/21 Right Arm (Active)                       Dressing/Packing:  Incision 02/16/21 Hip Right-Dressing/Treatment: Surgical glue(dermabond; aquacel; ) (02/16/21 1238)    Splint/Cast:  ]

## 2021-02-16 NOTE — PROGRESS NOTES
Ortho Post-Op Note    2/16/2021  5:12 PM    POD:  Day of Surgery  S/P:  Procedure(s):  REVISION RIGHT TOTAL HIP ARTHROPLASTY     Afebrile/VSS, NAD, A&O x 3  Doing well without complaints of nausea  Pain well controlled  Calves soft/NTTP Bilaterally  Thigh soft. Dressing clean and dry  Moving lower extremities well. Neurocirculatory exam intact and within normal range. Lab Results   Component Value Date/Time    HGB 12.8 02/10/2021 12:00 PM    INR 1.1 02/10/2021 12:00 PM     Recent Labs     02/10/21  1200   CREA 0.74   BUN 19     Estimated Creatinine Clearance: 51.4 mL/min (by C-G formula based on SCr of 0.74 mg/dL).   Visit Vitals  /71 (BP 1 Location: Left upper arm, BP Patient Position: Sitting)   Pulse 73   Temp 98.3 °F (36.8 °C)   Resp 17   Ht 5' 1\" (1.549 m)   Wt 58.2 kg (128 lb 5 oz)   SpO2 95%   BMI 24.24 kg/m²       PLAN:  DVT prophylaxis: ASA 81 mg bid  WBAT with PT-mobilization  Pain Control: Tylenol, toradol, oxycodone  Plan to D/C home tomorrow      [facility], [address], [address]   Department of Orthopaedics  [phone]

## 2021-02-16 NOTE — ANESTHESIA POSTPROCEDURE EVALUATION
Post-Anesthesia Evaluation and Assessment    Patient: Markie Danielle MRN: [id number]  SSN: [id number]    YOB: 1940  Age: 68 y.o. Sex: female      I have evaluated the patient and they are stable and ready for discharge from the PACU. Cardiovascular Function/Vital Signs  Visit Vitals  BP (!) 106/46   Pulse 60   Temp 36.3 °C (97.4 °F)   Resp 17   Ht 5' 1\" (1.549 m)   Wt 58.2 kg (128 lb 5 oz)   SpO2 97%   BMI 24.24 kg/m²       Patient is status post Spinal anesthesia for Procedure(s):  REVISION RIGHT TOTAL HIP ARTHROPLASTY . Nausea/Vomiting: None    Postoperative hydration reviewed and adequate. Pain:  Pain Scale 1: Numeric (0 - 10) (02/16/21 1315)  Pain Intensity 1: 0 (02/16/21 1315)   Managed    Neurological Status:   Neuro (WDL): Within Defined Limits (02/16/21 1315)  Neuro  LUE Motor Response: Purposeful (02/16/21 1315)  LLE Motor Response: Purposeful;Numbness (02/16/21 1315)  RUE Motor Response: Purposeful (02/16/21 1315)  RLE Motor Response: Purposeful;Numbness (02/16/21 1315)   At baseline    Mental Status, Level of Consciousness: Alert and  oriented to person, place, and time    Pulmonary Status:   O2 Device: Room air (02/16/21 1316)   Adequate oxygenation and airway patent    Complications related to anesthesia: None    Post-anesthesia assessment completed. No concerns    Signed By: Albert Rangel MD     February 16, 2021              Procedure(s):  REVISION RIGHT TOTAL HIP ARTHROPLASTY . spinal, MAC    <BSHSIANPOST>    INITIAL Post-op Vital signs:   Vitals Value Taken Time   /46 02/16/21 1410   Temp 36.3 °C (97.4 °F) 02/16/21 1316   Pulse 60 02/16/21 1417   Resp 12 02/16/21 1417   SpO2 97 % 02/16/21 1417   Vitals shown include unvalidated device data.

## 2021-02-17 VITALS
BODY MASS INDEX: 24.22 KG/M2 | DIASTOLIC BLOOD PRESSURE: 63 MMHG | RESPIRATION RATE: 16 BRPM | OXYGEN SATURATION: 98 % | HEIGHT: 61 IN | HEART RATE: 80 BPM | TEMPERATURE: 98.1 F | WEIGHT: 128.31 LBS | SYSTOLIC BLOOD PRESSURE: 147 MMHG

## 2021-02-17 PROBLEM — T84.84XA PAINFUL ORTHOPAEDIC HARDWARE (HCC): Status: ACTIVE | Noted: 2021-02-17

## 2021-02-17 LAB
ANION GAP SERPL CALC-SCNC: 8 MMOL/L (ref 5–15)
BUN SERPL-MCNC: 12 MG/DL (ref 6–20)
BUN/CREAT SERPL: 13 (ref 12–20)
CALCIUM SERPL-MCNC: 8.6 MG/DL (ref 8.5–10.1)
CHLORIDE SERPL-SCNC: 111 MMOL/L (ref 97–108)
CO2 SERPL-SCNC: 22 MMOL/L (ref 21–32)
CREAT SERPL-MCNC: 0.91 MG/DL (ref 0.55–1.02)
GLUCOSE SERPL-MCNC: 130 MG/DL (ref 65–100)
HGB BLD-MCNC: 10.6 G/DL (ref 11.5–16)
POTASSIUM SERPL-SCNC: 3.8 MMOL/L (ref 3.5–5.1)
SODIUM SERPL-SCNC: 141 MMOL/L (ref 136–145)

## 2021-02-17 PROCEDURE — 80048 BASIC METABOLIC PNL TOTAL CA: CPT

## 2021-02-17 PROCEDURE — 85018 HEMOGLOBIN: CPT

## 2021-02-17 PROCEDURE — 97535 SELF CARE MNGMENT TRAINING: CPT

## 2021-02-17 PROCEDURE — 74011250636 HC RX REV CODE- 250/636: Performed by: PHYSICIAN ASSISTANT

## 2021-02-17 PROCEDURE — 74011250637 HC RX REV CODE- 250/637: Performed by: PHYSICIAN ASSISTANT

## 2021-02-17 PROCEDURE — 97116 GAIT TRAINING THERAPY: CPT

## 2021-02-17 PROCEDURE — 36415 COLL VENOUS BLD VENIPUNCTURE: CPT

## 2021-02-17 PROCEDURE — 65270000029 HC RM PRIVATE

## 2021-02-17 PROCEDURE — 97165 OT EVAL LOW COMPLEX 30 MIN: CPT

## 2021-02-17 PROCEDURE — 74011000250 HC RX REV CODE- 250: Performed by: PHYSICIAN ASSISTANT

## 2021-02-17 RX ADMIN — KETOROLAC TROMETHAMINE 15 MG: 30 INJECTION, SOLUTION INTRAMUSCULAR at 07:19

## 2021-02-17 RX ADMIN — DOCUSATE SODIUM 50 MG AND SENNOSIDES 8.6 MG 1 TABLET: 8.6; 5 TABLET, FILM COATED ORAL at 08:38

## 2021-02-17 RX ADMIN — Medication 10 ML: at 02:37

## 2021-02-17 RX ADMIN — Medication 10 ML: at 07:19

## 2021-02-17 RX ADMIN — MEMANTINE HYDROCHLORIDE 10 MG: 10 TABLET ORAL at 08:38

## 2021-02-17 RX ADMIN — ACETAMINOPHEN 500 MG: 500 TABLET ORAL at 10:44

## 2021-02-17 RX ADMIN — ACETAMINOPHEN 500 MG: 500 TABLET ORAL at 07:19

## 2021-02-17 RX ADMIN — CITALOPRAM HYDROBROMIDE 20 MG: 20 TABLET ORAL at 08:38

## 2021-02-17 RX ADMIN — KETOROLAC TROMETHAMINE 15 MG: 30 INJECTION, SOLUTION INTRAMUSCULAR at 02:37

## 2021-02-17 RX ADMIN — POLYETHYLENE GLYCOL 3350 17 G: 17 POWDER, FOR SOLUTION ORAL at 08:38

## 2021-02-17 RX ADMIN — CEFAZOLIN SODIUM 2 G: 1 INJECTION, POWDER, FOR SOLUTION INTRAMUSCULAR; INTRAVENOUS at 02:36

## 2021-02-17 RX ADMIN — ASPIRIN 81 MG: 81 TABLET, COATED ORAL at 08:38

## 2021-02-17 NOTE — PROGRESS NOTES
Transition of Care Plan   RUR- N/A   DISPOSITION: The disposition plan is home with home health - [address] - patient accepted [phone]   Transport: Family - Granddaughter Kody Linn [phone]/  Tashi Thorne. Annalee Enrique -  [phone]     Reason for Admission:  REVISION RIGHT TOTAL HIP ARTHROPLASTY                    RUR Score:  N/A                  Plan for utilizing home health:   [address] - pending review. PCP: First and Last name:  Yumi Collins MD   Name of Practice: [facility]   Are you a current patient: Yes/No: Yes   Approximate date of last visit: 2-   Can you participate in a virtual visit with your PCP: Yes                    Current Advanced Directive/Advance Care Plan: Full Code, No ACP documentation on file at this time. Healthcare Decision Maker:   Click here to complete [address] including selection of the Healthcare Decision Maker Relationship (ie \"Primary\") -  Joanne Raines [phone]. Transition of Care Plan:   Jane Levine    Reviewed chart for transitions of care and discussed in rounds. CM met with patient at bedside to explain role and offer support. Patient is alert and oriented x4 and confirmed demographics. CM met with patient at bedside this morning to complete the initial evaluation assessment. Patient's granddaughter - Gabbie Silveira was also present during this assessment. Patient confirmed home address and reports that she lives with her  in a 3 leveled home. Patient reports that there are 4 steps at the entrance of the garage (the route in which she takes to get in and out of the home.) Patient reports that she has utilized Home Health services about 10 years ago, however is unable to provide the name of the facility at this time. Patient reports that she has not been to an IPR. Patient also reports prior to admission she has not utilized DMEs.  Patient reports that she uses the [facility] (located on  [address] [place] [address]) for her prescriptions. Patient reports that she is independent with ADLs. Patient reports she is interested in utilizing [facility] for [facility] services. Patient currently owns DME in which she will utilize at discharge. Observation notice provided in writing to patient and/or caregiver as well as verbal explanation of the policy. Patients who are in outpatient status also receive the Observation notice. Patient was provided with a copy and the original was placed in her chart. CM submitted patients referral via [employer]. CM received message from [facility] not being able to accept patient due to out of area. CM met with patient and patients granddaughter who reported that \"she also already been approved, I spoke with someone and they already approved her. \" CM submitted referrals to other [facility] agencies and also informed patient and patients granddaughter. CM contacted Bull Bobby at [facility] [phone] to inquire and it was reported that patient has been accepted for [facility]. Bull Bobby reports that they will take patient although she lives out of the area. CM followed up with patient and patients granddaughter. Medicare pt has received, reviewed, and signed 1st IM letter informing them of their right to appeal the discharge. Medicare pt has received, reviewed, and signed 2nd IM letter informing them of their right to appeal the discharge. Signed copy has been placed on pt bedside chart. AVS has been updated. CM will continue to follow. Care Management Interventions  PCP Verified by CM: Isabella Meade MD )  Last Visit to PCP: 02/11/21  Palliative Care Criteria Met (RRAT>21 & CHF Dx)?: No  Mode of Transport at Discharge:  Other (see comment)(Patient will either be picked up by her  or granddaughter.)  Transition of Care Consult (CM Consult): Discharge Planning, [address]: No  Reason Outside [facility] Chosen: Patient already serviced by other home care/hospice agency  Sophie Signup: Yes  Discharge Durable Medical Equipment: No  Physical Therapy Consult: Yes  Occupational Therapy Consult: Yes  Current Support Network: Lives with Spouse  Confirm Follow Up Transport: Family  The Patient and/or Patient Representative was Provided with a Choice of Provider and Agrees with the Discharge Plan?: Yes  Freedom of Choice List was Provided with Basic Dialogue that Supports the Patient's Individualized Plan of Care/Goals, Treatment Preferences and Shares the Quality Data Associated with the Providers?: Yes   Resource Information Provided?: No  Discharge Location  Discharge Placement: Home with home health    The Plan for Transition of Care is related to the following treatment goals: Home Health    The Patient was provided with a choice of provider and agrees with the discharge plan. [x] Yes [] No    Freedom of choice list was provided with basic dialogue that supports the patient's individualized plan of care/goals, treatment preferences and shares the quality data associated with the providers. [x] Yes [] No    At 11:18am - aven Sent provided contacted TW to inquire about the signed progress note from physician. CM submitted the AVS and the Brief Op Note via all scripts.   NEWTON Nye, CRM

## 2021-02-17 NOTE — PROGRESS NOTES
Occupational Therapy    Orders received, chart reviewed and patient evaluated by occupational therapy. Pending progression with skilled acute occupational therapy, recommend:  No skilled occupational therapy/ follow up rehabilitation needs identified at this time. Recommend with nursing ADLs with supervision/setup, OOB to chair 3x/day and toileting via functional mobility to and from bathroom SBA assist and walker. Thank you for completing as able in order to maintain patient strength, endurance and independence. Full evaluation to follow.      Thank you,  Eleuterio Good, OT

## 2021-02-17 NOTE — PROGRESS NOTES
Bedside and Verbal shift change report given to Bita Espinosa RN (oncoming nurse) by Zeus Griffiths RN (offgoing nurse). Report included the following information SBAR, Kardex, Procedure Summary, Intake/Output, MAR and Recent Results.

## 2021-02-17 NOTE — PROGRESS NOTES
OCCUPATIONAL THERAPY EVALUATION/DISCHARGE  Patient: Laura Kaplan (46 y.o. female)  Date: 2/17/2021  Primary Diagnosis: History of right hip replacement [Z96.641]  Wear articular bearing surface internal prosthetic right hip joint ([address] [T84.060A]  Painful orthopaedic hardware ([address] [T84.84XA]  Procedure(s) (LRB):  REVISION RIGHT TOTAL HIP ARTHROPLASTY  (Right) 1 Day Post-Op   Precautions:   Fall, WBAT, Total hip    ASSESSMENT  Based on the objective data described below, the patient presents with decreased STM, decreased strength/endurance, decreased mobility/balance and decreased distal LE ADL reaching; however, she is demonstrating high level of safe functional independence, completing LE ADLs with Min A at EOB/RW with use of AE PRN. Pt's granddaughter, who is practicing physical therapist, present for evaluation and reports she will assist pt once discharged home; however, she does not live with patient. Pt resides with  and has all recommended DME needs met as pt's granddaughter plans to independently acquire recommended AE and shower chair. Given pt's high current level of safe functional independence, DME/AE needs met, good in-home assist available and physical therapist following pt's mobility/balance deficits, no acute OT needs identified, with OT answering both parties questions/concerns and both parties thanking therapist for his efforts. Current Level of Function (ADLs/self-care): Min A    Functional Outcome Measure: The patient scored 70/100 on the Barthel Index outcome measure. Other factors to consider for discharge: none     PLAN :  Recommendation for discharge: (in order for the patient to meet his/her long term goals)  No skilled occupational therapy/ follow up rehabilitation needs identified at this time.     This discharge recommendation:  Has been made in collaboration with the attending provider and/or case management    IF patient discharges home will need the following DME: patient owns DME required for discharge       SUBJECTIVE:   Patient stated Thanks for your help.     OBJECTIVE DATA SUMMARY:   HISTORY:   Past Medical History:   Diagnosis Date    Arthritis     Dementia ([address]     History of motion sickness      Past Surgical History:   Procedure Laterality Date    HX APPENDECTOMY      AS A CHILD    HX COLONOSCOPY      HX HEENT      AS A CHILD    HX HIP REPLACEMENT Right 2010       Prior Level of Function/Environment/Context: Independent with ADLs at [facility]; STM deficits at baseline   Expanded or extensive additional review of patient history:     Home Situation  Home Environment: Private residence  # Steps to Enter: 3  Rails to Enter: No  One/Two Story Residence: Two story, live on 1st floor  Living Alone: No  Support Systems: Spouse/Significant Other/Partner  Patient Expects to be Discharged to[de-identified] Private residence  Current DME Used/Available at Home: Walker, rolling, Grab bars  Tub or Shower Type: Shower    Hand dominance: Right    EXAMINATION OF PERFORMANCE DEFICITS:  Cognitive/Behavioral Status:  Neurologic State: Alert  Orientation Level: Oriented X4(with STM deficits noted)  Cognition: Memory loss; Follows commands  Perception: Appears intact  Perseveration: No perseveration noted  Safety/Judgement: Awareness of environment    Hearing: Auditory  Auditory Impairment: None    Vision/Perceptual:    Corrective Lenses: Glasses    Range of Motion:  AROM: Generally decreased, functional  PROM: Generally decreased, functional    Strength:  Strength: Generally decreased, functional    Coordination:  Coordination: Within functional limits  Fine Motor Skills-Upper: Left Intact; Right Intact    Gross Motor Skills-Upper: Left Intact; Right Intact    Tone & Sensation:  Tone: Normal  Sensation: Intact  Balance:  Sitting: Intact  Standing: Intact; With support  Standing - Static: Good;Constant support  Standing - Dynamic : Good;Constant support    Functional Mobility and Transfers for ADLs:  Bed Mobility:  Supine to Sit: Contact guard assistance  Sit to Supine: Contact guard assistance  Scooting: Contact guard assistance    Transfers:  Sit to Stand: Contact guard assistance  Stand to Sit: Contact guard assistance  Bed to Chair: Contact guard assistance    ADL Assessment:  The patient recalled and demonstrated hip contraindications Right LE during ADLs and functional mobility with Min verbal cues. Feeding: Independent    Oral Facial Hygiene/Grooming: Contact guard assistance    Bathing: Minimum assistance    Upper Body Dressing: Setup    Lower Body Dressing: Minimum assistance    Toileting: Contact guard assistance       ADL Intervention and task modifications:  Patient instructed and indicated understanding the benefits of maintaining activity tolerance, functional mobility, and independence with self care tasks during acute stay  to ensure safe return home and to baseline. Encouraged patient to increase frequency and duration OOB, be out of bed for all meals, perform daily ADLs (as approved by RN/MD regarding bathing etc), and performing functional mobility to/from bathroom. Pt educated on safe transfer techniques, with specific emphasis on proper hand placement to push up from seated surface rather than attempt to pull self up, fully positioning self in-front of desired seated location, feeling chair on back of legs and reaching back with 1-2 UE to slowly lower self to seated position. Cognitive Retraining  Safety/Judgement: Awareness of environment    Bathing: Patient instructed when bathing to not submerge wound in water, stand to shower or sponge bathe, cover wound with plastic and tape to ensure no water reaches bandage/wound without cues. Patient indicated understanding. Dressing joint: Patient instructed and demonstrated to don/doff Right LE first/last Min verbal cues.   Patient instructed and demonstrated to don all clothing while sitting prior to standing, doff all clothing to knees while standing, then sit to doff clothing off from knees to feet in order to facilitate fall prevention, pain management, and energy conservation with Minimum assistance. Home safety: Patient instructed on home modifications and safety (raise height of ADL objects, appropriate height of chair surfaces, recliner safety, change of floor surfaces, clear pathways) to increase independence and fall prevention. Patient indicated understanding. Standing: Patient instructed and demonstrated to walk up to sink/counter top/surfaces, step into walker to increase safety of joint and fall prevention with Contact guard assistance. Instructed to apply concept of hip contraindications to ADLs within the home (no extreme movements, square off while using objects, slide objects along surfaces). Patient instructed to increase amount of time standing, observe standing position during ADLs in order to increase even weight bearing through bilateral LEs in order to increase independence with ADLs. Goal to be reached 30 days post - op, per orthopedic surgeon or per PT. Patient indicated understanding. Tub transfer: Patient instructed regarding when it is safe to begin transfer into tub (complete stairs with PT, advance exercises with PT high enough to clear tub height). Patient instructed to use the same technique as used with stairs when entering and exiting tub (\"up with the non-surgical, down with the surgical leg\"). Patient indicated understanding. Functional Measure:  Barthel Index:    Bathin  Bladder: 10  Bowels: 10  Groomin  Dressin  Feeding: 10  Mobility: 10  Stairs: 5  Toilet Use: 5  Transfer (Bed to Chair and Back): 10  Total: 70/100        The Barthel ADL Index: Guidelines  1. The index should be used as a record of what a patient does, not as a record of what a patient could do.   2. The main aim is to establish degree of independence from any help, physical or verbal, however minor and for whatever reason. 3. The need for supervision renders the patient not independent. 4. A patient's performance should be established using the best available evidence. Asking the patient, friends/relatives and nurses are the usual sources, but direct observation and common sense are also important. However direct testing is not needed. 5. Usually the patient's performance over the preceding 24-48 hours is important, but occasionally longer periods will be relevant. 6. Middle categories imply that the patient supplies over 50 per cent of the effort. 7. Use of aids to be independent is allowed. El Davies., Barthel, D.W. (7110). Functional evaluation: the Barthel Index. [address] [facility] (14)2. Esther Mccain jo-ann CLARISA Alston, Lucho Pugh., Tawanna Ormond., Ariana, [address] (1999). Measuring the change indisability after inpatient rehabilitation; comparison of the responsiveness of the Barthel Index and Functional [place] Measure. Journal of Neurology, Neurosurgery, and Psychiatry, 66(4), 917-381. ZHOU Gao, SHERIE Smiley, & Jennifer Briggs MNELLIE. (2004.) Assessment of post-stroke quality of life in cost-effectiveness studies: The usefulness of the Barthel Index and the EuroQoL-5D.  Quality of Life Research, 15, 755-90         Occupational Therapy Evaluation Charge Determination   History Examination Decision-Making   LOW Complexity : Brief history review  LOW Complexity : 1-3 performance deficits relating to physical, cognitive , or psychosocial skils that result in activity limitations and / or participation restrictions  LOW Complexity : No comorbidities that affect functional and no verbal or physical assistance needed to complete eval tasks       Based on the above components, the patient evaluation is determined to be of the following complexity level: LOW   Pain Rating:  No c/o pain    Activity Tolerance:   Good and requires rest breaks      After treatment patient left in no apparent distress: Seated EOB with physical therapist present    COMMUNICATION/EDUCATION:   The patients plan of care was discussed with: Physical therapist, Registered nurse and Case management.      Thank you for this referral.  Rose Batista OT  Time Calculation: 38 mins

## 2021-02-17 NOTE — ROUTINE PROCESS
Bedside shift change report given to Rupert Álvarez (oncoming nurse) by Neha Gilmore (offgoing nurse). Report included the following information SBAR.

## 2021-02-17 NOTE — PROGRESS NOTES
Problem: Mobility Impaired (Adult and Pediatric)  Goal: *Acute Goals and Plan of Care (Insert Text)  Description: FUNCTIONAL STATUS PRIOR TO ADMISSION: Patient was modified independent using a rollator for functional mobility. HOME SUPPORT PRIOR TO ADMISSION: The patient lived with  to provide assistance. Physical Therapy Goals  Initiated 2/16/2021    1. Patient will move from supine to sit and sit to supine , scoot up and down, and roll side to side in bed with modified independence within 4 days. 2. Patient will perform sit to stand with modified independence within 4 days. 3. Patient will ambulate with modified independence for 150 feet with the least restrictive device within 4 days. 4. Patient will ascend/descend 4 stairs with cane and one handrail(s) with supervision within 4 days. 5. Patient will verbalize and demonstrate understanding of 3/3 posterior hip positioning precautions per protocol within 4 days. 6. Patient will perform post-JUANPABLO home exercise program per protocol with minimal cueing within 4 days. Outcome: Resolved/Met   PHYSICAL THERAPY TREATMENT/DISCHARGE  Patient: Daniel Cook (10 y.o. 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 ASSESSMENT  Patient continues with skilled PT services and has met all of PT goals. She mainly needs supervision and cueing for safety due to dementia. Granddaughter, who is a PT, is present for session. Patient is cleared for discharge from PT standpoint.  Patient  is modified independent (cues for memory) with post-op JUANPABLO exercise protocol and has same in written, illlustrated form. PT Discharge instructions reviewed. Patient and granddaughter demonstrated understanding. Barthel Functional Score has improved to 70/100, indicating moderate-minimal impaired ability to care for basic self-needs/dependency on others. Other factors to consider for discharge: Motivated/A & O x 3/Supportive Family/Modified independent PLOF          PLAN :  Patient will be discharged from acute skilled physical therapy at this time. Rationale for discharge:  Goals achieved    Recommendation for discharge: (in order for the patient to meet his/her long term goals)  Physical therapy at least 2 days/week in the home     This discharge recommendation:  Has been made in collaboration with the attending provider and/or case management    IF patient discharges home will need the following DME: patient owns DME required for discharge       SUBJECTIVE:   Patient stated I want to go home.     OBJECTIVE DATA SUMMARY:   Critical Behavior:      A & O x 3  Needs supervision for safety awareness        Functional Mobility Training:  Bed Mobility:     Supine to Sit: Contact guard assistance  Sit to Supine: Contact guard assistance  Scooting: Contact guard assistance        Transfers:  Sit to Stand: Contact guard assistance  Stand to Sit: Contact guard assistance        Bed to Chair: Contact guard assistance                    Balance:  Sitting: Intact  Standing: Intact; With support  Standing - Static: Good;Constant support  Standing - Dynamic : Good;Constant support  Ambulation/Gait Training:  Distance (ft): 150 Feet (ft)  Assistive Device: Walker, rolling;Gait belt  Ambulation - Level of Assistance: Contact guard assistance        Gait Abnormalities: Antalgic;Decreased step clearance  Right Side Weight Bearing: As tolerated     Base of Support: Widened;Shift to left  Stance: Right decreased  Speed/Vivi: Slow  Step Length: Left shortened  Swing Pattern: Right asymmetrical Interventions: Safety awareness training;Verbal cues    Stairs:  Number of Stairs Trained: 4  Stairs - Level of Assistance: Contact guard assistance   Rail Use: Left (one rail and cane)    Therapeutic Exercises:   Patient  is independent with post-op JUANPABLO exercise protocol and has same in written, illlustrated form. Functional Measures:  Barthel Index:    Bathin  Bladder: 10  Bowels: 10  Groomin  Dressin  Feeding: 10  Mobility: 10  Stairs: 5  Toilet Use: 5  Transfer (Bed to Chair and Back): 10  Total: 70/100       The Barthel ADL Index: Guidelines  1. The index should be used as a record of what a patient does, not as a record of what a patient could do. 2. The main aim is to establish degree of independence from any help, physical or verbal, however minor and for whatever reason. 3. The need for supervision renders the patient not independent. 4. A patient's performance should be established using the best available evidence. Asking the patient, friends/relatives and nurses are the usual sources, but direct observation and common sense are also important. However direct testing is not needed. 5. Usually the patient's performance over the preceding 24-48 hours is important, but occasionally longer periods will be relevant. 6. Middle categories imply that the patient supplies over 50 per cent of the effort. 7. Use of aids to be independent is allowed. Jorge L Galindo., Barthel, D.W. ([id number]). Functional evaluation: the Barthel Index. [address] [facility] (14)2. CLARISA Vera, Stacy Xiong, [address] (). Measuring the change indisability after inpatient rehabilitation; comparison of the responsiveness of the Barthel Index and Functional [place] Measure. Journal of Neurology, Neurosurgery, and Psychiatry, 66(4), 668-777.   Markel Sarmiento, N.J.A, SHERIE Smiley, & Mena Saenz M.A. (2004.) Assessment of post-stroke quality of life in cost-effectiveness studies: The usefulness of the Barthel Index and the EuroQoL-5D. Quality of Life Research, 13, 427-43        Pain Ratin/10 \"really sore\"    Activity Tolerance:   Good    After treatment patient left in no apparent distress:   Sitting in chair, Call bell within reach, Caregiver / family present, and nurse notified. COMMUNICATION/COLLABORATION:   The patients plan of care was discussed with: Occupational therapist and Registered nurse and .      Emma Liu   Time Calculation: 35 mins

## 2021-02-17 NOTE — PROGRESS NOTES
Primary Nurse Sulma Phan RN and Nicole Turner RN performed a dual skin assessment on this patient No impairment noted  Hamlet score is 21

## 2021-02-17 NOTE — PROGRESS NOTES
Orthopaedics Daily Progress Note                            Date of Surgery:  2/16/2021      Patient: Amanda Park   YOB: 1940  Age: 68 y.o. SUBJECTIVE:   1 Day Post-Op following REVISION RIGHT TOTAL HIP ARTHROPLASTY . The patient's post operative pain is controlled. No CP/SOB. No N/V. The patient's mobility will be evaluated today during PT sessions. OBJECTIVE:     Vital Signs:      Visit Vitals  /65   Pulse 71   Temp 98.7 °F (37.1 °C)   Resp 15   Ht 5' 1\" (1.549 m)   Wt 58.2 kg (128 lb 5 oz)   SpO2 95%   BMI 24.24 kg/m²       Physical Exam:  General: A&Ox3. The patient is cooperative, and in no acute distress. Respiratory: Respirations are unlabored. Surgical site(s): dressing clean, dry  Musculoskeletal: Calves are soft, supple, and non-tender upon palpation. Motor 5/5. Neurological:  Neurovascularly intact with good dorsi and plantar flexion. Pulses symmetrical.    Laboratory Values:             Recent Results (from the past 12 hour(s))   HEMOGLOBIN    Collection Time: 02/17/21  2:46 AM   Result Value Ref Range    HGB 10.6 (L) 11.5 - 63.3 g/dL   METABOLIC PANEL, BASIC    Collection Time: 02/17/21  2:46 AM   Result Value Ref Range    Sodium 141 136 - 145 mmol/L    Potassium 3.8 3.5 - 5.1 mmol/L    Chloride 111 (H) 97 - 108 mmol/L    CO2 22 21 - 32 mmol/L    Anion gap 8 5 - 15 mmol/L    Glucose 130 (H) 65 - 100 mg/dL    BUN 12 6 - 20 MG/DL    Creatinine 0.91 0.55 - 1.02 MG/DL    BUN/Creatinine ratio 13 12 - 20      GFR est AA >60 >60 ml/min/1.73m2    GFR est non-AA 60 (L) >60 ml/min/1.73m2    Calcium 8.6 8.5 - 10.1 MG/DL         PLAN:     S/P REVISION RIGHT TOTAL HIP ARTHROPLASTY  -Continue WBAT. -Mobilize and continue with PT/OT until discharged     Hemodynamics Hgb today is 10.6. Acute blood loss anemia asexpected. Patient asymptomatic. Continue to monitor. Wound Monitor postop dressing; no postop dressing changes necessary. Reinforce PRN.      Post Operative Pain Pain Control: stable, mild-to-moderate joint symptoms intermittently, reasonably well controlled by current meds. DVT Prophylaxis Continue with SCD'S, Ankle Pump Exercises. ASA 81mg BID     Discharge Disposition Discharge plan: Home with HHPT pending clearance.        Signed By: Han Leavitt PA-C  February 17, 2021 8:17 AM

## 2021-02-17 NOTE — PROGRESS NOTES
Problem: Mobility Impaired (Adult and Pediatric)  Goal: *Acute Goals and Plan of Care (Insert Text)  Description: FUNCTIONAL STATUS PRIOR TO ADMISSION: Patient was modified independent using a rollator for functional mobility. HOME SUPPORT PRIOR TO ADMISSION: The patient lived with  to provide assistance. Physical Therapy Goals  Initiated 2/16/2021    1. Patient will move from supine to sit and sit to supine , scoot up and down, and roll side to side in bed with modified independence within 4 days. 2. Patient will perform sit to stand with modified independence within 4 days. 3. Patient will ambulate with modified independence for 150 feet with the least restrictive device within 4 days. 4. Patient will ascend/descend 4 stairs with cane and one handrail(s) with supervision within 4 days. 5. Patient will verbalize and demonstrate understanding of 3/3 posterior hip positioning precautions per protocol within 4 days. 6. Patient will perform post-JUANPABLO home exercise program per protocol with minimal cueing within 4 days. Outcome: Progressing Towards Goal   PHYSICAL THERAPY EVALUATION  Patient: Tori Son (70 y.o. 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 PLOF: Modified independent with ADLs. Lives with  on first floor of a 2 story home, 2 steps (no rail) to enter. Daughter reports that patient has mild dementia. Patient's mobility was on target for POD#0.  Will address more exercises, increase gait distance, negotiate stairs and assess for discharge at am PT session tomorrow. Patient instructed NOT to get up from bed, chair or commode without calling for assistance. Initiated post JUANPABLO exercise protocol and wrote same on [employer]. Reviewed 3/3 posterior hip positioning precautions and will continue to reinforce. Anticipate discharge after 1-2 more PT sessions. Current Level of Function Impacting Discharge (mobility/balance): Minimal assistance-contact guard assistance for all mobility. Ambulated 45 ft with RW and gait belt, antalgic, small steps and step-to, but steady gait. Functional Outcome Measure: The patient scored 45/100 on the Barthel outcome measure which is indicative of moderate impaired ability to care for basic self-needs/dependency on others. .      Other factors to consider for discharge: Motivated/A & O x 3/Supportive Family/Modified independent PLOF      Patient will benefit from skilled therapy intervention to address the above noted impairments. PLAN :  Recommendations and Planned Interventions: bed mobility training, transfer training, gait training, therapeutic exercises, patient and family training/education, and therapeutic activities      Frequency/Duration: Patient will be followed by physical therapy:  twice daily to address goals. Recommendation for discharge: (in order for the patient to meet his/her long term goals)  Physical therapy at least 2 days/week in the home     This discharge recommendation:  Has been made in collaboration with the attending provider and/or case management    IF patient discharges home will need the following DME: patient owns DME required for discharge         SUBJECTIVE:   Patient stated Deandra Escalona are we getting ready to do now? Suki Craig    OBJECTIVE DATA SUMMARY:   HISTORY:    Past Medical History:   Diagnosis Date    Arthritis     Dementia [address]     History of motion sickness      Past Surgical History:   Procedure Laterality Date    HX APPENDECTOMY      AS A CHILD    HX COLONOSCOPY  HX HEENT      AS A CHILD    HX HIP REPLACEMENT Right 2010       Personal factors and/or comorbidities impacting plan of care: Motivated/A & O x 3/Supportive Family/Modified independent PLOF     Home Situation  Home Environment: Private residence  # Steps to Enter: 3  Rails to Enter: No  One/Two Story Residence: Two story, live on 1st floor  Living Alone: No  Support Systems: Spouse/Significant Other/Partner  Patient Expects to be Discharged to[de-identified] Private residence  Current DME Used/Available at Home: Walker, rolling, Grab bars  Tub or Shower Type: Shower    EXAMINATION/PRESENTATION/DECISION MAKING:   Critical Behavior:   A & O x 3  Daughter reports mild dementia    Range Of Motion:  AROM: Generally decreased, functional           PROM: Generally decreased, functional           Strength:    Strength: Generally decreased, functional                    Tone & Sensation:   Tone: Normal              Sensation: Intact               Coordination:  Coordination: Within functional limits  Vision:      Functional Mobility:  Bed Mobility:     Supine to Sit: Minimum assistance; Moderate assistance; Adaptive equipment(to manage RLE)  Sit to Supine: Moderate assistance  Scooting: Minimum assistance  Transfers:  Sit to Stand: Minimum assistance  Stand to Sit: Contact guard assistance                       Balance:   Sitting: Intact; High guard  Standing: Impaired; Without support  Standing - Static: Good;Constant support  Standing - Dynamic : Fair;Constant support  Ambulation/Gait Training:  Distance (ft): 45 Feet (ft)  Assistive Device: Walker, rolling;Gait belt  Ambulation - Level of Assistance: Contact guard assistance;Minimal assistance        Gait Abnormalities: Antalgic;Decreased step clearance; Step to gait  Right Side Weight Bearing: As tolerated     Base of Support: Widened;Shift to left  Stance: Right decreased  Speed/Vivi: Slow  Step Length: Left shortened  Swing Pattern: Right asymmetrical     Interventions: Safety awareness training;Verbal cues              Therapeutic Exercises: Ankle Pumps  Ham Sets  Quad Sets  Glute Sets  Heel Slides  X 10 each every hour     Functional Measure:  Barthel Index:    Bathin  Bladder: 10  Bowels: 10  Groomin  Dressin  Feeding: 10  Mobility: 0  Stairs: 0  Toilet Use: 5  Transfer (Bed to Chair and Back): 5  Total: 45/100       The Barthel ADL Index: Guidelines  1. The index should be used as a record of what a patient does, not as a record of what a patient could do. 2. The main aim is to establish degree of independence from any help, physical or verbal, however minor and for whatever reason. 3. The need for supervision renders the patient not independent. 4. A patient's performance should be established using the best available evidence. Asking the patient, friends/relatives and nurses are the usual sources, but direct observation and common sense are also important. However direct testing is not needed. 5. Usually the patient's performance over the preceding 24-48 hours is important, but occasionally longer periods will be relevant. 6. Middle categories imply that the patient supplies over 50 per cent of the effort. 7. Use of aids to be independent is allowed. Nia Caicedo., Barthel, D.W. ([id number]). Functional evaluation: the Barthel Index. [address] [facility] (14)2. CLARISA Dye, Eliazar Melo., Maddy Navarro, [place], [address] [place] [address] (). Measuring the change indisability after inpatient rehabilitation; comparison of the responsiveness of the Barthel Index and Functional [place] Measure. Journal of Neurology, Neurosurgery, and Psychiatry, 66(4), 248-445. Maxi Biggs, N.J.A, SHERIE Smiley, & Genny Pagan, MKrishanA. (2004.) Assessment of post-stroke quality of life in cost-effectiveness studies: The usefulness of the Barthel Index and the EuroQoL-5D.  Quality of Life Research, 15, 140-40           Physical Therapy Evaluation Charge Determination   History Examination Presentation Decision-Making   MEDIUM  Complexity : 1-2 comorbidities / personal factors will impact the outcome/ POC  LOW Complexity : 1-2 Standardized tests and measures addressing body structure, function, activity limitation and / or participation in recreation  LOW Complexity : Stable, uncomplicated  LOW Complexity : FOTO score of       Based on the above components, the patient evaluation is determined to be of the following complexity level: LOW     Pain Ratin/10    Activity Tolerance:   Vitals:    21 1657 21 1706 21 1732 21 1825   BP: 128/69 133/71 128/71 (!) 116/58   BP 1 Location: Left upper arm Left upper arm Left upper arm Left upper arm   BP Patient Position: Supine; At rest Sitting At rest At rest   Pulse: 72 73 72 75   Resp:   16 16   Temp:   99.1 °F (37.3 °C) 97.8 °F (36.6 °C)   SpO2:   95% 97%   Weight:       Height:            After treatment patient left in no apparent distress:   Supine in bed, Call bell within reach, Caregiver / family present, Side rails x 3, and nurse notified. COMMUNICATION/EDUCATION:   The patients plan of care was discussed with: Registered nurse and Rehabilitation technician. Fall prevention education was provided and the patient/caregiver indicated understanding., Patient/family have participated as able in goal setting and plan of care. , and Patient/family agree to work toward stated goals and plan of care.     Thank you for this referral.  Irma Castro   Time Calculation: 35 mins

## 2021-02-18 NOTE — PROGRESS NOTES
I have reviewed discharge instructions with the patient and granddaughter (physical therapist). The patient and granddaughter verbalized understanding. Patient was taken in a wheelchair to Patient Discharge along with discharge instructions, dressing supplies, walker, ice packs and sleeve, and patient's belongings.

## 2021-02-18 NOTE — OP NOTES
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 PROCEDURE PERFORMED:  Revision right total hip arthroplasty with exchange of polyethylene liner and femoral head. SURGEON:  Gio Quinones MD    ASSISTANT:  Dhruv Jon PA-C    ANESTHESIA:  Spinal with sedation. COMPLICATIONS:  None. SPECIMENS REMOVED:  Culture x3. IMPLANTS:  Components implanted, DePuy +4 10-degree face changing acetabular liner for 52-mm Mckenna acetabular shell; 36-mm +1.5 revision ceramic femoral head. ESTIMATED BLOOD LOSS:  200 mL. INDICATIONS:  The patient is a 77-year-old female who had sudden-onset right hip pain with crepitus just over a week ago that occurred when she was rising from a chair. She presented the next day for evaluation. Radiographs demonstrate the femoral head and her right hip prosthesis articulating on the inner surface of the acetabular shell. There is a shadow on the AP image that likely represents the dislodged and spun-out polyethylene liner. She presents to the operating room for revision right total hip replacement. Risks, benefits, alternatives of procedure reviewed with her in detail and she desires to proceed. The risks, benefits, alternative were also reviewed with the patient's family. PROCEDURE IN DETAIL:  The patient was taken to the operating room where Anesthesia Team placed a spinal.  Preoperative IV antibiotics were administered. She was turned to the left lateral decubitus position on a [facility] frame hip positioner. All bony prominences were well-padded and an axillary roll was placed.   Right hip and thigh were prepped and draped in usual sterile fashion. Her existing short lateral hip scar was a little bit posterior to the trochanter. I elected to perform a completely separate incision more anterior to that. Posterior approach was performed. On entering the affected joint space, normal joint fluid was encountered. Gluteus ulises tendon was incised to help facilitate exposure and mobilization of the femur. Synovial tissue appeared normal.  I sent synovial fluid specimen and 2 synovial tissue specimens for culture. The polyethylene liner was spun-out inferiorly and once I got enough exposure, it was easily removed by hand. Several of the antirotation locking tabs were sheared off. There was no gross visible damage on the posterior surface of the liner. There was some asymmetric layer on what I believe was the anterior rim on the liner likely from impingement of the femoral neck; however, I could not definitively ascertain the ancillary orientation of the liner before spun-out. There was no visible damage to the inner aspect of the acetabular shell. Femoral head was removed. Inferior capsular scar was incised and some of the anterior-superior capsular scar was excised to create a pocket for the trunnion of the femoral neck to retract it anteriorly and superiorly. Scar was removed from the face of the acetabular component, shell appeared to be well fixed. Inversion of the acetabular component was somewhat neutral.  The hip was reduced initially with a neutral trial, followed by a +4 neutral liner trial, followed by +4 10-degree face changing acetabular liner trial with the elevated rim placed straight posteriorly with +1.5 head trial in place, which was the same size that was used previously, the +4 10-degree face changing liner produced most satisfactory stability. There was no internal impingement with full extension, external rotation, and the hip was stable anteriorly.   Hip was stable to straight hyperflexion and with the hip flexed 90 degrees in neutral abduction, there was approximately 60-70 degrees of internal rotation. Trials were removed and wound was copiously irrigated by pulse lavage before the real components were implanted. Same leg length and stability were achieved. Periarticular soft tissues were injected with solution containing 0.5% ropivacaine with epinephrine as well as clonidine and Toradol. Posterior capsular scar was repaired with inner aspect of posterior greater trochanter through drill holes using #2 Ethibond sutures. Gluteus ulises tendon was repaired with heavy interrupted Vicryl sutures. The fascia was closed with a combination of heavy Vicryl sutures and a running #2 Stratafix suture. Skin and subcutaneous layers were closed in layered fashion with Vicryl and a running Monocryl subcuticular stitch. Wound was dressed with Dermabond and Aquacel occlusive dressing. The patient's bladder was decompressed with straight catheterization before she was transported to the postanesthesia care unit in stable condition. All counts were correct at the end of the procedure. The physician assistant was critical throughout the case to assist with positioning, retraction, and closure. There were no other available residents, fellows, or surgical assistants available to assist during this procedure.         Pari Serrato MD      [id number]  D:  02/17/2021 21:50  T:  02/18/2021 5:59  JOB #:  [id number]  CC:  MD Vince Valdes MD

## 2021-03-02 LAB
BACTERIA SPEC CULT: NORMAL
GRAM STN SPEC: NORMAL
SERVICE CMNT-IMP: NORMAL

## 2021-03-19 ENCOUNTER — OFFICE VISIT (OUTPATIENT)
Dept: NEUROLOGY | Age: 81
End: 2021-03-19
Payer: MEDICARE

## 2021-03-19 VITALS
HEIGHT: 61 IN | DIASTOLIC BLOOD PRESSURE: 68 MMHG | BODY MASS INDEX: 24.17 KG/M2 | RESPIRATION RATE: 16 BRPM | SYSTOLIC BLOOD PRESSURE: 122 MMHG | WEIGHT: 128 LBS | OXYGEN SATURATION: 98 % | HEART RATE: 83 BPM

## 2021-03-19 DIAGNOSIS — F03.90 DEMENTIA WITHOUT BEHAVIORAL DISTURBANCE, UNSPECIFIED DEMENTIA TYPE: Primary | ICD-10-CM

## 2021-03-19 PROCEDURE — 1111F DSCHRG MED/CURRENT MED MERGE: CPT | Performed by: NURSE PRACTITIONER

## 2021-03-19 PROCEDURE — 1090F PRES/ABSN URINE INCON ASSESS: CPT | Performed by: NURSE PRACTITIONER

## 2021-03-19 PROCEDURE — 1101F PT FALLS ASSESS-DOCD LE1/YR: CPT | Performed by: NURSE PRACTITIONER

## 2021-03-19 PROCEDURE — G8536 NO DOC ELDER MAL SCRN: HCPCS | Performed by: NURSE PRACTITIONER

## 2021-03-19 PROCEDURE — G8427 DOCREV CUR MEDS BY ELIG CLIN: HCPCS | Performed by: NURSE PRACTITIONER

## 2021-03-19 PROCEDURE — 99214 OFFICE O/P EST MOD 30 MIN: CPT | Performed by: NURSE PRACTITIONER

## 2021-03-19 PROCEDURE — G8420 CALC BMI NORM PARAMETERS: HCPCS | Performed by: NURSE PRACTITIONER

## 2021-03-19 PROCEDURE — G8432 DEP SCR NOT DOC, RNG: HCPCS | Performed by: NURSE PRACTITIONER

## 2021-03-19 PROCEDURE — G8400 PT W/DXA NO RESULTS DOC: HCPCS | Performed by: NURSE PRACTITIONER

## 2021-03-19 NOTE — PATIENT INSTRUCTIONS
PRESCRIPTION REFILL POLICY [facility] Statement to Patients April 1, 2014 In an effort to ensure the large volume of patient prescription refills is processed in the most efficient and expeditious manner, we are asking our patients to assist us by calling your Pharmacy for all prescription refills, this will include also your  Mail Order Pharmacy. The pharmacy will contact our office electronically to continue the refill process. Please do not wait until the last minute to call your pharmacy. We need at least 48 hours (2days) to fill prescriptions. We also encourage you to call your pharmacy before going to  your prescription to make sure it is ready. With regard to controlled substance prescription refill requests (narcotic refills) that need to be picked up at our office, we ask your cooperation by providing us with at least 72 hours (3days) notice that you will need a refill. We will not refill narcotic prescription refill requests after 4:00pm on any weekday, Monday through Thursday, or after 2:00pm on Fridays, or on the weekends. We encourage everyone to explore another way of getting your prescription refill request processed using [employer], our patient web portal through our electronic medical record system. [employer] is an efficient and effective way to communicate your medication request directly to the office and  downloadable as an brant on your smart phone . [employer] also features a review functionality that allows you to view your medication list as well as leave messages for your physician. Are you ready to get connected? If so please review the attatched instructions or speak to any of our staff to get you set up right away! Thank you so much for your cooperation. Should you have any questions please contact our Practice Administrator. The Physicians and Staff,  [facility]

## 2021-03-19 NOTE — PROGRESS NOTES
Neurology Follow-up  Carol Todd NP      Visit Date: March 19, 2021    Patient: Merry Sage MRN: [id number]  SSN: [id number]    YOB: 1940  Age: 68 y.o. Sex: female      PCP: Ping Chen MD      Previous records (physician notes, laboratory reports, and radiology reports) and imaging studies were reviewed and summarized. Subjective:     HPI: Merry Sage is a 68 y.o. female presented with family to Dr Emery Monday 2/22/2019 for evaluation of memory issues. Family reported that she will ask the same question, repeat something that she has already said, trouble remembering people's names, difficulty cooking recipes that she knows, buys grocery items twice. She has had confusion about where she is and how to get to a certain place while driving. She takes citalopram for anxiety and has been on it for several years. She had comprehensive neuropsychological assessment and the report was reviewed. It was consistent with dementia and anxiety. A year later, 2/25/20, having some difficulties with long-term memory as well. She scored 23/30 on Wilbert cognitive assessment. Short-term recall was 0/5. She had difficulties with the date. 9/11/20 Last visit w/ Dr Emery Monday. Overall, there has been some decline in  her memory. Sometimes so she will have problems with logical thinking and reasoning. 22/30 on MoCA. Short-term recall was 0/5. A/P She has significantly impaired short-term memory but did quite well on all other cognitive domains. Subtle decline over the past 6 months. She can only tolerate 5 mg of donepezil. Will start Namenda at 5 mg daily and titrate in a customary fashion to 10 mg twice daily.     Interval History:     Pt presents today, 03/19/21 with her . The patient feels she is doing well. She is taking memantine 10 mg twice daily and donepezil 5 mg nightly. She did not tolerate the 10 mg dose.   Patient's  believes she has been pretty stable from a cognitive standpoint. She is currently not driving as she recently had a right hip replacement surgically repaired. Patient is limited by the recent hip surgery, but is generally very active. She is in physical therapy and hopes to get back to her walks. She has no sleep problems; no wandering, no hallucinations, no behavior issues. Physically she looks very well. She still cooks but her  lives with her at home. She does like playing Soduku and reading. She would normally be more social but Covid limits that. She does talk with her daughter a lot on the telephone. Review of systems  Review of systems negative except as detailed in the HPI, interval, PMH and A&P        Past Medical History:   Diagnosis Date    Arthritis     Dementia ([facility] [address]     History of motion sickness      Past Surgical History:   Procedure Laterality Date    HX APPENDECTOMY      AS A CHILD    HX COLONOSCOPY      HX HEENT      AS A CHILD    HX HIP REPLACEMENT Right 2010      Family History   Problem Relation Age of Onset    Dementia Mother     Heart Disease Father     Heart Failure Father     Alcohol abuse Father     Cancer Maternal Aunt     Dementia Brother     Anesth Problems Neg Hx      Social History     Tobacco Use    Smoking status: Never Smoker    Smokeless tobacco: Never Used   Substance Use Topics    Alcohol use: Yes     Frequency: Never     Comment: 4-6 glasses wine      Current Outpatient Medications   Medication Sig Dispense Refill    memantine (NAMENDA) 10 mg tablet Take 1 Tab by mouth two (2) times a day. 180 Tab 2    donepezil (ARICEPT) 10 mg tablet Take 5 mg by mouth nightly.  citalopram (CELEXA) 20 mg tablet Take 1 Tab by mouth daily. 30 Tab 2    pravastatin (PRAVACHOL) 20 mg tablet Take 20 mg by mouth nightly.  aspirin delayed-release 81 mg tablet Take 1 Tab by mouth two (2) times a day.  Indications: blood clot prevention 60 Tab 0    senna-docusate (PERICOLACE) 8.6-50 mg per tablet Take 1 Tab by mouth two (2) times daily as needed for Constipation. 60 Tab 0    acetaminophen (TYLENOL) 500 mg tablet Take 1 Tab by mouth every four (4) hours. 100 Tab 0    multivitamin (ONE A DAY) tablet Take 1 Tab by mouth daily.  OTHER Indications: CALCIUM GUMMIES 1 DAILY          No Known Allergies       Objective:      Visit Vitals  /68   Pulse 83   Resp 16   Ht 5' 1\" (1.549 m)   Wt 128 lb (58.1 kg)   SpO2 98%   BMI 24.19 kg/m²        General: No distress. Well groomed  Heart: Regular rate and rhythm. Lungs: Unlabored  Musculoskeletal: Extremities w/o edema or muscle wasting   Skin: Warm dry skin  Psych: Good mood and affect     Neurologic Exam:  Mental Status:  Alert and oriented to self, year and month. She knew the president was a Green party and that it was not Trump, but could not remember his name. She knew [employer]. She remembered her children's names and her grandchildren's names. Orientation to take effort. Mildly impaired processing  Coherent conversation  Able to follow simple directions without difficulty     Language:    Normal fluency and comprehension    Cranial Nerves:   Pupils equal, round and reactive to light. Extraocular movements intact w/o nystagmus      Facial sensation intact to LT. Facial activation symmetric. Hearing intact bilaterally. No dysarthria. Shoulder shrug 5/5 bilaterally. Motor:    Bulk and tone normal.      5/5 strength in all extremities. No pronator drift. No involuntary movements, resting or intention tremor    Sensation:    Sensation intact throughout to light touch    Coordination & Gait: No ataxia with finger to nose. No Romberg. Gait normal      MMSE  No flowsheet data found.      PHQ  3 most recent PHQ Screens 3/19/2021   Little interest or pleasure in doing things Not at all   Feeling down, depressed, irritable, or hopeless Not at all   Total Score PHQ 2 0       Lab Results Component Value Date/Time    WBC 9.3 02/10/2021 12:00 PM    HCT 38.6 02/10/2021 12:00 PM    HGB 10.6 (L) 02/17/2021 02:46 AM    PLATELET 793 39/94/6646 12:00 PM       Lab Results   Component Value Date/Time    Sodium 141 02/17/2021 02:46 AM    Potassium 3.8 02/17/2021 02:46 AM    Chloride 111 (H) 02/17/2021 02:46 AM    CO2 22 02/17/2021 02:46 AM    Glucose 130 (H) 02/17/2021 02:46 AM    BUN 12 02/17/2021 02:46 AM    Creatinine 0.91 02/17/2021 02:46 AM    Calcium 8.6 02/17/2021 02:46 AM       No components found for: TROPQUANT,  SGOT,  GPT,  ALT,  AP,  TBIL,  TBILI,  TP,  ALB,  GLOB,  GGT,  AML,  AMYP,  LPSE,  HLPSE    No results found for: JENNIFER    Lab Results   Component Value Date/Time    Hemoglobin A1c 5.6 02/10/2021 12:00 PM        Lab Results   Component Value Date/Time    Vitamin B12 >2000 (H) 09/27/2019 04:01 PM    Folate 6.8 02/22/2019 01:51 PM       No results found for: JENNIFER, ANARX, ANAIGG, XBANA    No results found for: CHOL, CHOLPOCT, CHOLX, CHLST, CHOLV, HDL, HDLPOC, HDLP, LDL, LDLCPOC, LDLC, DLDLP, VLDLC, VLDL, TGLX, TRIGL, TRIGP, TGLPOCT, CHHD, CHHDX    XR Results   Results from [place] encounter on 02/16/21   XR HIP RT W OR WO PELV  1 VW    Impression Right hip prosthesis is in good position. CT Results:  No results found for this or any previous visit. MRI Results:  Results from [place] encounter on 01/10/19   MRI BRAIN W WO CONT    Narrative EXAM:  MRI BRAIN W WO CONT    INDICATION:    Memory loss    COMPARISON:  None. CONTRAST: 20 ml Dotarem. TECHNIQUE:    Multiplanar multisequence acquisition without and with contrast of the brain. FINDINGS:  Mild generalized parenchymal volume loss with commensurate dilation of the sulci  and ventricular system. Scattered periventricular and deep white matter T2/FLAIR  hyperintensities, consistent with mild chronic microvascular ischemic disease. 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  There is no acute infarct,  hemorrhage, extra-axial fluid collection, or mass effect. There is no cerebellar  tonsillar herniation. Expected arterial flow-voids are present. No evidence of  abnormal enhancement. The paranasal sinuses, mastoid air cells, and middle ears are clear. The orbital  contents are within normal limits. No significant osseous or scalp lesions are  identified. Degenerative disc disease at C2-C3 and C3-C4. Impression IMPRESSION:   1. No evidence of acute intracranial abnormality. 2. Mild generalized parenchymal volume loss and mild chronic microvascular  ischemic disease. 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 VAS/US Results (maximum last 3): No results found for this or any previous visit. TTE         EKG  Results for orders placed or performed during the hospital encounter of 02/10/21   EKG, 12 LEAD, INITIAL   Result Value Ref Range    Ventricular Rate 63 BPM    Atrial Rate 63 BPM    P-R Interval 196 ms    QRS Duration 78 ms    Q-T Interval 416 ms    QTC Calculation (Bezet) 425 ms    Calculated P Axis 35 degrees    Calculated R Axis 10 degrees    Calculated T Axis 45 degrees    Diagnosis       Normal sinus rhythm  Cannot rule out Anterior infarct , age undetermined  Abnormal ECG  No previous ECGs available  Confirmed by Basia Cote MD, Ashlee Villa ([id number]) on 2/10/2021 2:21:25 PM             Assessment/Plan:     Amrita Smiley is a 68 y.o. female has mild to moderate dementia, likely mixed type. She has significantly impaired short-term memory but does quite well on all other cognitive domains. Information was given regarding the Alzheimer's Society. I strongly encouraged patient and  to contact the Alzheimer's Society for support programs and other services. Discussed that the importance of normal sleep cycle. Discussed maintaining frequent mobilization and trying to stay physically active. Keep the lights bright until about 1 hour before bedtime. Minimize naps during the day.  When possible, social interaction and games and puzzles are helpful to stimulate her mind. Also people with dementia do better when they have a routine, a predictable day. The natural course of this condition and the likelihood that her needs will increase with time were discussed. Fortunately she has been relatively stable from a cognitive standpoint. Continue donepezil 5 mg  Continue Namenda 10 mg twice daily  Follow-up as needed. Instructed to call with questions or concerns. Visit Diagnoses    ICD-10-CM ICD-9-CM    1.  Dementia without behavioral disturbance, unspecified dementia type [facility])  F03.90 294.20            Signed By: Jania López NP     March 19, 2021 9:30 PM

## 2021-11-03 DIAGNOSIS — F03.90 DEMENTIA WITHOUT BEHAVIORAL DISTURBANCE, UNSPECIFIED DEMENTIA TYPE: ICD-10-CM

## 2021-11-04 RX ORDER — MEMANTINE HYDROCHLORIDE 10 MG/1
TABLET ORAL
Qty: 180 TABLET | Refills: 2 | Status: SHIPPED | OUTPATIENT
Start: 2021-11-04 | End: 2022-05-23

## 2022-02-23 ENCOUNTER — OFFICE VISIT (OUTPATIENT)
Dept: ORTHOPEDIC SURGERY | Age: 82
End: 2022-02-23
Payer: MEDICARE

## 2022-02-23 VITALS — HEIGHT: 61 IN | BODY MASS INDEX: 23.6 KG/M2 | WEIGHT: 125 LBS

## 2022-02-23 DIAGNOSIS — M76.891 TENDINITIS INVOLVING RIGHT HIP ABDUCTORS: ICD-10-CM

## 2022-02-23 DIAGNOSIS — Z96.649 STATUS POST REVISION OF TOTAL HIP REPLACEMENT: Primary | ICD-10-CM

## 2022-02-23 PROCEDURE — 99213 OFFICE O/P EST LOW 20 MIN: CPT | Performed by: ORTHOPAEDIC SURGERY

## 2022-02-23 PROCEDURE — G8536 NO DOC ELDER MAL SCRN: HCPCS | Performed by: ORTHOPAEDIC SURGERY

## 2022-02-23 PROCEDURE — 1090F PRES/ABSN URINE INCON ASSESS: CPT | Performed by: ORTHOPAEDIC SURGERY

## 2022-02-23 PROCEDURE — G8427 DOCREV CUR MEDS BY ELIG CLIN: HCPCS | Performed by: ORTHOPAEDIC SURGERY

## 2022-02-23 PROCEDURE — G8432 DEP SCR NOT DOC, RNG: HCPCS | Performed by: ORTHOPAEDIC SURGERY

## 2022-02-23 PROCEDURE — G8420 CALC BMI NORM PARAMETERS: HCPCS | Performed by: ORTHOPAEDIC SURGERY

## 2022-02-23 PROCEDURE — 1101F PT FALLS ASSESS-DOCD LE1/YR: CPT | Performed by: ORTHOPAEDIC SURGERY

## 2022-02-23 PROCEDURE — G8400 PT W/DXA NO RESULTS DOC: HCPCS | Performed by: ORTHOPAEDIC SURGERY

## 2022-02-23 RX ORDER — DICLOFENAC SODIUM 75 MG/1
75 TABLET, DELAYED RELEASE ORAL 2 TIMES DAILY
Qty: 28 TABLET | Refills: 0 | Status: SHIPPED | OUTPATIENT
Start: 2022-02-23 | End: 2022-03-15

## 2022-02-23 NOTE — PROGRESS NOTES
Jamie Castro (: 1940) is a 80 y.o. female, patient, here for evaluation of the following chief complaint(s):  Surgical Follow-up ([facility] rev 1 year follow-up)       SUBJECTIVE/OBJECTIVE:  Jamie Castro presents today for routine follow-up 1 year after revision right total hip replacement, for treatment of dissociated acetabular liner. Uneventful recovery. Overall she is functioning well. However, she has had some trochanteric symptoms with activities. Does not awaken her at night. She was walking up to a mile a day previously, but now she is a little bit timid to try to do that. She is scared she would be able to make it all the way back home. She has underlying dementia. Her granddaughter is a physical therapist and has written out exercises for her to do on a regular basis, as she does not do them unless somebody is there physically to remind her. PHYSICAL EXAM:  Vitals: Ht 5' 1\" (1.549 m)   Wt 125 lb (56.7 kg)   BMI 23.62 kg/m²   Body mass index is 23.62 kg/m². 80y.o. year old F, no distress. Delightful. Ambulates without limp or Trendelenburg gait. No Trendelenburg sign. Right lateral hip scar well-healed. Moderate point tenderness at the proximal trochanter. Pain in the same area with hip flexion internal rotation. Negative Stinchfield. Symmetrical palpable distal pulses. No gross motor or sensory deficits in lower extremities. No distal edema. IMAGING:  Radiographs: XR Results (most recent):  Results from Appointment encounter on 22    XR HIP RT W OR WO PELV 2-3 VWS    Narrative  3 x-ray views right hip including AP pelvis, AP and frog lateral images demonstrate satisfactory position alignment of cementless right total hip components with signs of ingrowth present. No evidence of loosening. No wear or lysis. ASSESSMENT/PLAN:  1.  Status post revision of total hip replacement  -     XR HIP RT W OR WO PELV 2-3 VWS; Future  -     REFERRAL TO PHYSICAL THERAPY  2. Tendinitis involving right hip abductors  -     REFERRAL TO PHYSICAL THERAPY  -     diclofenac EC (VOLTAREN) 75 mg EC tablet; Take 1 Tablet by mouth two (2) times a day., Normal, Disp-28 Tablet, R-0    The xray and exam findings were discussed with the patient today. Well-functioning right total hip, 1 year after revision for dissociated polyethylene liner. I think she has some low-grade abductor tendinopathy. Discussed conservative treatment measures. We will try a very short course of prescription anti-inflammatories. May supplement with Tylenol. I will send her back to physical therapy for refresher. She will return for routine long-term follow-up of her hip, sooner if needed. No follow-ups on file. Review Of Systems  ROS     Positive for: Musculoskeletal    Last edited by Aamir Lagos RN on 2/23/2022 10:03 AM. (History)         Patient denies any recent fever, chills, nausea, vomiting, chest pain, or shortness of breath. No Known Allergies    Current Outpatient Medications   Medication Sig    diclofenac EC (VOLTAREN) 75 mg EC tablet Take 1 Tablet by mouth two (2) times a day.  memantine (NAMENDA) 10 mg tablet TAKE 1 TABLET BY MOUTH TWICE DAILY    donepezil (ARICEPT) 10 mg tablet Take 5 mg by mouth nightly.  citalopram (CELEXA) 20 mg tablet Take 1 Tab by mouth daily.  pravastatin (PRAVACHOL) 20 mg tablet Take 20 mg by mouth nightly.  aspirin delayed-release 81 mg tablet Take 1 Tab by mouth two (2) times a day. Indications: blood clot prevention    senna-docusate (PERICOLACE) 8.6-50 mg per tablet Take 1 Tab by mouth two (2) times daily as needed for Constipation.  acetaminophen (TYLENOL) 500 mg tablet Take 1 Tab by mouth every four (4) hours.  multivitamin (ONE A DAY) tablet Take 1 Tab by mouth daily.  OTHER Indications: CALCIUM GUMMIES 1 DAILY     No current facility-administered medications for this visit.        Past Medical History:   Diagnosis Date    Arthritis     Dementia ([facility] [address]     History of motion sickness         Past Surgical History:   Procedure Laterality Date    HX APPENDECTOMY      AS A CHILD    HX COLONOSCOPY      HX HEENT      AS A CHILD    HX HIP REPLACEMENT Right 2010       Family History   Problem Relation Age of Onset    Dementia Mother     Heart Disease Father     Heart Failure Father     Alcohol abuse Father     Cancer Maternal Aunt     Dementia Brother     Anesth Problems Neg Hx         Social History     Socioeconomic History    Marital status:      Spouse name: Not on file    Number of children: Not on file    Years of education: Not on file    Highest education level: Not on file   Occupational History    Not on file   Tobacco Use    Smoking status: Never Smoker    Smokeless tobacco: Never Used   Vaping Use    Vaping Use: Never used   Substance and Sexual Activity    Alcohol use: Yes     Comment: 4-6 glasses wine    Drug use: Never    Sexual activity: Not Currently   Other Topics Concern    Not on file   Social History Narrative    Not on file     Social Determinants of Health     Financial Resource Strain:     Difficulty of Paying Living Expenses: Not on file   Food Insecurity:     Worried About Running Out of Food in the Last Year: Not on file    Garo of Food in the Last Year: Not on file   Transportation Needs:     Lack of Transportation (Medical): Not on file    Lack of Transportation (Non-Medical):  Not on file   Physical Activity:     Days of Exercise per Week: Not on file    Minutes of Exercise per Session: Not on file   Stress:     Feeling of Stress : Not on file   Social Connections:     Frequency of Communication with Friends and Family: Not on file    Frequency of Social Gatherings with Friends and Family: Not on file    Attends Caodaism Services: Not on file    Active Member of Clubs or Organizations: Not on file    Attends Club or Organization Meetings: Not on file   [facility] Marital Status: Not on file   Intimate Partner Violence:     Fear of Current or Ex-Partner: Not on file    Emotionally Abused: Not on file    Physically Abused: Not on file    Sexually Abused: Not on file   Housing Stability:     Unable to Pay for Housing in the Last Year: Not on file    Number of [place] in the Last Year: Not on file    Unstable Housing in the Last Year: Not on file       Orders Placed This Encounter    XR HIP RT W OR WO PELV 2-3 VWS     4b     Standing Status:   Future     Number of Occurrences:   1     Standing Expiration Date:   2/24/2023    REFERRAL TO PHYSICAL THERAPY     Referral Priority:   Routine     Referral Type:   PT/OT/ST     Referral Reason:   Specialty Services Required     Requested Specialty:   Physical Therapy     Number of Visits Requested:   1    diclofenac EC (VOLTAREN) 75 mg EC tablet     Sig: Take 1 Tablet by mouth two (2) times a day. Dispense:  28 Tablet     Refill:  0        An electronic signature was used to authenticate this note.   -- Trav Rivera MD

## 2022-02-23 NOTE — Clinical Note
2/23/2022    Patient: Teresa Ayoub   YOB: 1940   Date of Visit: 2/23/2022     Annia Lambert MD  [address] [place] [id number]  Via Fax: [phone]    Dear Annia Lambert MD,      Thank you for referring Ms. Alberto Christopher to [facility] for evaluation. My notes for this consultation are attached. If you have questions, please do not hesitate to call me. I look forward to following your patient along with you.       Sincerely,    Katharina Solis MD

## 2022-03-15 ENCOUNTER — OFFICE VISIT (OUTPATIENT)
Dept: NEUROLOGY | Age: 82
End: 2022-03-15
Payer: MEDICARE

## 2022-03-15 VITALS
OXYGEN SATURATION: 98 % | BODY MASS INDEX: 23.6 KG/M2 | HEIGHT: 61 IN | RESPIRATION RATE: 16 BRPM | HEART RATE: 70 BPM | DIASTOLIC BLOOD PRESSURE: 60 MMHG | WEIGHT: 125 LBS | SYSTOLIC BLOOD PRESSURE: 110 MMHG

## 2022-03-15 DIAGNOSIS — F03.90 DEMENTIA WITHOUT BEHAVIORAL DISTURBANCE, UNSPECIFIED DEMENTIA TYPE: Primary | ICD-10-CM

## 2022-03-15 PROCEDURE — 1090F PRES/ABSN URINE INCON ASSESS: CPT | Performed by: PSYCHIATRY & NEUROLOGY

## 2022-03-15 PROCEDURE — G8420 CALC BMI NORM PARAMETERS: HCPCS | Performed by: PSYCHIATRY & NEUROLOGY

## 2022-03-15 PROCEDURE — G8400 PT W/DXA NO RESULTS DOC: HCPCS | Performed by: PSYCHIATRY & NEUROLOGY

## 2022-03-15 PROCEDURE — G8510 SCR DEP NEG, NO PLAN REQD: HCPCS | Performed by: PSYCHIATRY & NEUROLOGY

## 2022-03-15 PROCEDURE — 1101F PT FALLS ASSESS-DOCD LE1/YR: CPT | Performed by: PSYCHIATRY & NEUROLOGY

## 2022-03-15 PROCEDURE — 99214 OFFICE O/P EST MOD 30 MIN: CPT | Performed by: PSYCHIATRY & NEUROLOGY

## 2022-03-15 PROCEDURE — G8427 DOCREV CUR MEDS BY ELIG CLIN: HCPCS | Performed by: PSYCHIATRY & NEUROLOGY

## 2022-03-15 PROCEDURE — G8536 NO DOC ELDER MAL SCRN: HCPCS | Performed by: PSYCHIATRY & NEUROLOGY

## 2022-03-15 NOTE — PROGRESS NOTES
Chief Complaint   Patient presents with    Dementia         HISTORY OF PRESENT ILLNESS  Katarzyna Leon came back for follow-up. She came with her  and daughter  Family thinks that there has been some decline in her memory. She needs guidance/supervision with certain activities. She cannot go out of the house by herself and will usually need direction about what needs to be done. Still remains independent with her basic activities of daily living  She has to be reminded that she needs to take her pills. She will do things around the house such as cleaning and laundry etc. Independently  Does not cook much anymore  She does crossword puzzles, goes out for walks regularly    She had comprehensive neuropsychological assessment and it was consistent with dementia and anxiety. Currently taking donepezil 5 mg daily and memantine 10 Mg twice a day. Could not tolerate higher dose of donepezil    RECAP  She has been having short-term memory issues for the past 3 years. Family says that she will tend to ask the same questions or will repeat something that she has already said. She has also had some trouble remembering people's names and at times will have difficulty cooking recipes that she has done for a long period. She will by certain grocery items twice as she forgets that she has already it bought before. She has had occasional confusion about where she is and how to get to a certain place while driving but it eventually comes to her. Denies any headache, changes in vision, speech, swallowing ability, focal motor or sensory deficits, tremors, balance problems or falls. She takes citalopram for anxiety and has been on it for several years. She keeps wondering that if it was the medication that was giving her memory problem. She does feel anxious every now and then but nothing significant.      Past Medical History:   Diagnosis Date    Arthritis     Dementia ([facility] [address]     History of motion sickness      Current Outpatient Medications   Medication Sig    memantine (NAMENDA) 10 mg tablet TAKE 1 TABLET BY MOUTH TWICE DAILY    acetaminophen (TYLENOL) 500 mg tablet Take 1 Tab by mouth every four (4) hours.  donepezil (ARICEPT) 10 mg tablet Take 5 mg by mouth nightly.  citalopram (CELEXA) 20 mg tablet Take 1 Tab by mouth daily.  pravastatin (PRAVACHOL) 20 mg tablet Take 20 mg by mouth nightly.  multivitamin (ONE A DAY) tablet Take 1 Tab by mouth daily. (Patient not taking: Reported on 3/15/2022)     No current facility-administered medications for this visit. PHYSICAL EXAMINATION:    Visit Vitals  /60   Pulse 70   Resp 16   Ht 5' 1\" (1.549 m)   Wt 125 lb (56.7 kg)   SpO2 98%   BMI 23.62 kg/m²         NEUROLOGICAL EXAMINATION:     Mental Status:   Alert and oriented to person, place, and time. Se scored 21/30 on [place] cognitive assessment. Short-term recall was 0/5. She had difficulties with today's date correctly,   Attention span and concentration are normal. Speech is fluent . Cranial Nerves:    II, III, IV, VI:  Visual acuity grossly intact. Visual fields are normal.    Pupils are equal, round, and reactive. Extra-ocular movements are full and fluid. V-XII: Hearing is grossly intact. Facial features are symmetric, with normal sensation and strength. The palate rises symmetrically and the tongue protrudes midline. Motor Examination: Normal tone, bulk, and strength. Sensory exam:  Normal throughout     Coordination:  Finger to nose and rapid arm movement testing was normal.   No resting or intention tremor    Gait and Station:  Steady. Normal arm swing. No muscle wasting or fasiculations noted.         LABS / IMAGING    MRI of the brain done recently showed nonspecific age-related changes  Lab Results   Component Value Date/Time    Vitamin B12 >2000 (H) 09/27/2019 04:01 PM    Folate 6.8 02/22/2019 01:51 PM     Lab Results   Component Value Date/Time TSH 2.010 02/22/2019 01:51 PM       ASSESSMENT    ICD-10-CM ICD-9-CM    1. Dementia without behavioral disturbance, unspecified dementia type [facility])  F03.90 294.20        DISCUSSION  Ms. Teresa Ayoub has has mild to moderate dementia, mixed type   She has significantly impaired short-term memory but did quite well on all other cognitive domains. There has not been any significant change in her cognitive scores over the past 1 year  Continue donepezil and memantine. She has only tolerated donepezil 5 mg daily.    Continue to stay physically and mentally active  Different resources to strengthen her registration and recall were discussed  She has a very supportive family and is under 24-hour supervision  Follow-up annually    Time 30 minutes    Elaina Patrick MD  Diplomate, American Board of Psychiatry & Neurology (Neurology)  Brittanie Sharif Board of Psychiatry & Neurology (Clinical Neurophysiology)  Diplomate, American Board of Electrodiagnostic Medicine

## 2022-03-18 PROBLEM — T84.060A WEAR ARTICULAR BEARING SURFACE INTERNAL PROSTHETIC RIGHT HIP JOINT (HCC): Status: ACTIVE | Noted: 2021-02-16

## 2022-03-19 PROBLEM — Z96.641 HISTORY OF RIGHT HIP REPLACEMENT: Status: ACTIVE | Noted: 2021-02-16

## 2022-03-19 PROBLEM — T84.84XA PAINFUL ORTHOPAEDIC HARDWARE (HCC): Status: ACTIVE | Noted: 2021-02-17

## 2022-05-22 DIAGNOSIS — F03.90 DEMENTIA WITHOUT BEHAVIORAL DISTURBANCE, UNSPECIFIED DEMENTIA TYPE: ICD-10-CM

## 2022-05-23 RX ORDER — MEMANTINE HYDROCHLORIDE 10 MG/1
TABLET ORAL
Qty: 180 TABLET | Refills: 2 | Status: SHIPPED | OUTPATIENT
Start: 2022-05-23 | End: 2022-08-29 | Stop reason: SDUPTHER

## 2022-08-26 ENCOUNTER — TELEPHONE (OUTPATIENT)
Dept: NEUROLOGY | Age: 82
End: 2022-08-26

## 2022-08-29 DIAGNOSIS — F03.90 DEMENTIA WITHOUT BEHAVIORAL DISTURBANCE, UNSPECIFIED DEMENTIA TYPE: ICD-10-CM

## 2022-08-31 RX ORDER — MEMANTINE HYDROCHLORIDE 10 MG/1
10 TABLET ORAL 2 TIMES DAILY
Qty: 180 TABLET | Refills: 2 | Status: SHIPPED | OUTPATIENT
Start: 2022-08-31

## 2022-11-22 NOTE — PERIOP NOTES
Called and updated family on procedure start and patient progress
TRANSFER - OUT REPORT:    Verbal report given to Cecy Carrizales RN(name) on Danish Riojas  being transferred to [facility](unit) for routine post - op       Report consisted of patients Situation, Background, Assessment and   Recommendations(SBAR). Information from the following report(s) SBAR, Kardex, OR Summary, Procedure Summary, Intake/Output, MAR and Cardiac Rhythm NSR was reviewed with the receiving nurse. Lines:   Peripheral IV 02/16/21 Right Arm (Active)   Site Assessment Clean, dry, & intact 02/16/21 1315   Phlebitis Assessment 0 02/16/21 1315   Infiltration Assessment 0 02/16/21 1315   Dressing Status Clean, dry, & intact 02/16/21 1315   Dressing Type Transparent;Tape 02/16/21 1315   Hub Color/Line Status Pink; Infusing 02/16/21 1315   Action Taken Open ports on tubing capped 02/16/21 1315   Alcohol Cap Used Yes 02/16/21 1315        Opportunity for questions and clarification was provided.       Patient transported with:   O2 @ 2 liters  Tech
Price (Do Not Change): 0.00
Instructions: This plan will send the code FBSE to the PM system.  DO NOT or CHANGE the price.
Detail Level: Generalized

## 2023-03-14 ENCOUNTER — OFFICE VISIT (OUTPATIENT)
Dept: NEUROLOGY | Age: 83
End: 2023-03-14
Payer: MEDICARE

## 2023-03-14 VITALS
OXYGEN SATURATION: 99 % | BODY MASS INDEX: 23.79 KG/M2 | RESPIRATION RATE: 17 BRPM | HEART RATE: 70 BPM | DIASTOLIC BLOOD PRESSURE: 68 MMHG | SYSTOLIC BLOOD PRESSURE: 112 MMHG | WEIGHT: 126 LBS | HEIGHT: 61 IN

## 2023-03-14 DIAGNOSIS — F03.90 DEMENTIA WITHOUT BEHAVIORAL DISTURBANCE (HCC): Primary | ICD-10-CM

## 2023-03-14 PROCEDURE — 1090F PRES/ABSN URINE INCON ASSESS: CPT | Performed by: PSYCHIATRY & NEUROLOGY

## 2023-03-14 PROCEDURE — G8427 DOCREV CUR MEDS BY ELIG CLIN: HCPCS | Performed by: PSYCHIATRY & NEUROLOGY

## 2023-03-14 PROCEDURE — 99205 OFFICE O/P NEW HI 60 MIN: CPT | Performed by: PSYCHIATRY & NEUROLOGY

## 2023-03-14 PROCEDURE — 1101F PT FALLS ASSESS-DOCD LE1/YR: CPT | Performed by: PSYCHIATRY & NEUROLOGY

## 2023-03-14 PROCEDURE — G8400 PT W/DXA NO RESULTS DOC: HCPCS | Performed by: PSYCHIATRY & NEUROLOGY

## 2023-03-14 PROCEDURE — G8510 SCR DEP NEG, NO PLAN REQD: HCPCS | Performed by: PSYCHIATRY & NEUROLOGY

## 2023-03-14 PROCEDURE — G8420 CALC BMI NORM PARAMETERS: HCPCS | Performed by: PSYCHIATRY & NEUROLOGY

## 2023-03-14 PROCEDURE — G8536 NO DOC ELDER MAL SCRN: HCPCS | Performed by: PSYCHIATRY & NEUROLOGY

## 2023-03-14 PROCEDURE — 1123F ACP DISCUSS/DSCN MKR DOCD: CPT | Performed by: PSYCHIATRY & NEUROLOGY

## 2023-03-14 NOTE — PROGRESS NOTES
Chief Complaint   Patient presents with    Follow-up    Dementia     Family reports short term memory getting worse         [address] Magdalena Rocha came back for follow-up. She came with her  and daughter. Family thinks that there has been some decline in her memory. She is still independent with basic activities of daily living. She does not drive anymore as she has a tendency to forget where she is going  She needs guidance/supervision with certain activities. She cannot go out of the house by herself and will usually need direction about what needs to be done. She takes her morning medications well need reminder about the nighttime pills. Does not cook much anymore  She does crossword puzzles, goes out for walks regularly    She had comprehensive neuropsychological assessment and it was consistent with dementia and anxiety. Currently taking donepezil 10 mg daily and memantine 10 Mg twice a day. RECAP  She has been having short-term memory issues for the past 3 years. Family says that she will tend to ask the same questions or will repeat something that she has already said. She has also had some trouble remembering people's names and at times will have difficulty cooking recipes that she has done for a long period. She will by certain grocery items twice as she forgets that she has already it bought before. She has had occasional confusion about where she is and how to get to a certain place while driving but it eventually comes to her. Denies any headache, changes in vision, speech, swallowing ability, focal motor or sensory deficits, tremors, balance problems or falls. She takes citalopram for anxiety and has been on it for several years. She keeps wondering that if it was the medication that was giving her memory problem. She does feel anxious every now and then but nothing significant.      Past Medical History:   Diagnosis Date    Arthritis     Dementia [facility])     History of motion sickness      Current Outpatient Medications   Medication Sig    memantine (NAMENDA) 10 mg tablet Take 1 Tablet by mouth two (2) times a day. acetaminophen (TYLENOL) 500 mg tablet Take 1 Tab by mouth every four (4) hours. donepeziL (ARICEPT) 10 mg tablet Take 5 mg by mouth nightly. citalopram (CELEXA) 20 mg tablet Take 1 Tab by mouth daily. pravastatin (PRAVACHOL) 20 mg tablet Take 20 mg by mouth nightly. multivitamin (ONE A DAY) tablet Take 1 Tab by mouth daily. (Patient not taking: No sig reported)     No current facility-administered medications for this visit. PHYSICAL EXAMINATION:    Visit Vitals  /68 (BP 1 Location: Left leg, BP Patient Position: Sitting, BP Cuff Size: Adult)   Pulse 70   Resp 17   Ht 5' 1\" (1.549 m)   Wt 126 lb (57.2 kg)   SpO2 99%   BMI 23.81 kg/m²         NEUROLOGICAL EXAMINATION:     Mental Status:   Alert and oriented to person, place, and time. Se scored 18/30 on [place] cognitive assessment. Short-term recall was 0/5. She had difficulties with today's date correctly,   Attention span and concentration are normal. Speech is fluent . Cranial Nerves:    II, III, IV, VI:  Visual acuity grossly intact. Visual fields are normal.    Pupils are equal, round, and reactive. Extra-ocular movements are full and fluid. V-XII: Hearing is grossly intact. Facial features are symmetric, with normal sensation and strength. The palate rises symmetrically and the tongue protrudes midline. Motor Examination: Normal tone, bulk, and strength. Sensory exam:  Normal throughout     Coordination:  Finger to nose and rapid arm movement testing was normal.   No resting or intention tremor    Gait and Station:  Steady. Normal arm swing. No muscle wasting or fasiculations noted.         LABS / IMAGING    MRI of the brain done recently showed nonspecific age-related changes  Lab Results   Component Value Date/Time    Vitamin B12 >2000 (H) 09/27/2019 04:01 PM    Folate 6.8 02/22/2019 01:51 PM     Lab Results   Component Value Date/Time    TSH 2.010 02/22/2019 01:51 PM       ASSESSMENT    ICD-10-CM ICD-9-CM    1. Dementia without behavioral disturbance [facility])  F03.90 294.20           DISCUSSION  Ms. Carlitos Gonzales has moderate dementia, mixed type. There has been a slow decline over the past 4 to 5 years. She has significantly impaired short-term memory and is also starting to have some problems with visuospatial function and orientation. There has been a slight decline in her cognitive score over the past 1 year  Continue donepezil 10 mg daily and memantine 10 mg twice a day  Continue to stay physically and mentally active  Family understands that this may be a progressive Alzheimer's type dementia and her needs may increase over time. She has a very supportive family and is under 24-hour supervision. I am not concerned about her living situation at this time.   She does not drive and  handles the money/finances/bills etc.  Follow-up annually    Time 40 minutes    Radha Carrera MD  Diplomate, American Board of Psychiatry & Neurology (Neurology)  Jaimie Farr Board of Psychiatry & Neurology (Clinical Neurophysiology)  Diplomate, American Board of Electrodiagnostic Medicine GYN

## 2023-03-14 NOTE — PROGRESS NOTES
Chief Complaint   Patient presents with    Follow-up    Dementia     Family reports short term memory getting worse    Visit Vitals  /68 (BP 1 Location: Left leg, BP Patient Position: Sitting, BP Cuff Size: Adult)   Pulse 70   Resp 17   Ht 5' 1\" (1.549 m)   Wt 126 lb (57.2 kg)   SpO2 99%   BMI 23.81 kg/m²

## 2023-05-17 DIAGNOSIS — F03.90 DEMENTIA WITHOUT BEHAVIORAL DISTURBANCE (HCC): Primary | ICD-10-CM

## 2023-05-18 RX ORDER — MEMANTINE HYDROCHLORIDE 10 MG/1
TABLET ORAL
Qty: 180 TABLET | Refills: 3 | Status: SHIPPED | OUTPATIENT
Start: 2023-05-18

## 2024-03-15 ENCOUNTER — OFFICE VISIT (OUTPATIENT)
Age: 84
End: 2024-03-15
Payer: MEDICARE

## 2024-03-15 VITALS
HEART RATE: 78 BPM | WEIGHT: 120 LBS | BODY MASS INDEX: 22.66 KG/M2 | SYSTOLIC BLOOD PRESSURE: 118 MMHG | HEIGHT: 61 IN | DIASTOLIC BLOOD PRESSURE: 68 MMHG | OXYGEN SATURATION: 98 %

## 2024-03-15 DIAGNOSIS — F01.50 MIXED ALZHEIMER'S AND VASCULAR DEMENTIA (HCC): Primary | ICD-10-CM

## 2024-03-15 DIAGNOSIS — F02.80 MIXED ALZHEIMER'S AND VASCULAR DEMENTIA (HCC): Primary | ICD-10-CM

## 2024-03-15 DIAGNOSIS — G30.9 MIXED ALZHEIMER'S AND VASCULAR DEMENTIA (HCC): Primary | ICD-10-CM

## 2024-03-15 PROCEDURE — G8400 PT W/DXA NO RESULTS DOC: HCPCS | Performed by: PSYCHIATRY & NEUROLOGY

## 2024-03-15 PROCEDURE — 1123F ACP DISCUSS/DSCN MKR DOCD: CPT | Performed by: PSYCHIATRY & NEUROLOGY

## 2024-03-15 PROCEDURE — 1036F TOBACCO NON-USER: CPT | Performed by: PSYCHIATRY & NEUROLOGY

## 2024-03-15 PROCEDURE — G8427 DOCREV CUR MEDS BY ELIG CLIN: HCPCS | Performed by: PSYCHIATRY & NEUROLOGY

## 2024-03-15 PROCEDURE — G8484 FLU IMMUNIZE NO ADMIN: HCPCS | Performed by: PSYCHIATRY & NEUROLOGY

## 2024-03-15 PROCEDURE — 1090F PRES/ABSN URINE INCON ASSESS: CPT | Performed by: PSYCHIATRY & NEUROLOGY

## 2024-03-15 PROCEDURE — G8420 CALC BMI NORM PARAMETERS: HCPCS | Performed by: PSYCHIATRY & NEUROLOGY

## 2024-03-15 PROCEDURE — 99214 OFFICE O/P EST MOD 30 MIN: CPT | Performed by: PSYCHIATRY & NEUROLOGY

## 2024-03-15 NOTE — PROGRESS NOTES
Chief Complaint   Patient presents with    OTHER     Pt returning for H/O  Pt's family feel the recall and retention have worsened       HISTORY OF PRESENT ILLNESS  Julia Mack came back for follow-up.  She came with her  and daughter.  Family thinks that there has been some decline in her memory.  She is having more difficulty registering information and has been repeating herself.  She is still independent with basic activities of daily living.  She does not drive anymore as she has a tendency to forget where she is going  She needs guidance/supervision with certain activities.  She cannot go out of the house by herself and will usually need direction about what needs to be done.  She takes her morning medications well need reminder about the nighttime pills.  Does not cook much anymore  She does crossword puzzles, watches game shows, goes out for walks regularly    She had comprehensive neuropsychological assessment and it was consistent with dementia and anxiety.    Currently taking donepezil 10 mg daily and memantine 10 Mg twice a day.     RECAP  She has been having short-term memory issues for the past 3 years.  Family says that she will tend to ask the same questions or will repeat something that she has already said.  She has also had some trouble remembering people's names and at times will have difficulty cooking recipes that she has done for a long period.  She will by certain grocery items twice as she forgets that she has already it bought before.  She has had occasional confusion about where she is and how to get to a certain place while driving but it eventually comes to her.  Denies any headache, changes in vision, speech, swallowing ability, focal motor or sensory deficits, tremors, balance problems or falls.   She takes citalopram for anxiety and has been on it for several years.  She keeps wondering that if it was the medication that was giving her memory problem.  She does feel

## 2024-05-11 DIAGNOSIS — F03.90 DEMENTIA WITHOUT BEHAVIORAL DISTURBANCE (HCC): ICD-10-CM

## 2024-05-13 RX ORDER — MEMANTINE HYDROCHLORIDE 10 MG/1
TABLET ORAL
Qty: 180 TABLET | Refills: 3 | Status: SHIPPED | OUTPATIENT
Start: 2024-05-13

## 2025-03-25 ENCOUNTER — OFFICE VISIT (OUTPATIENT)
Age: 85
End: 2025-03-25
Payer: MEDICARE

## 2025-03-25 VITALS
HEART RATE: 63 BPM | DIASTOLIC BLOOD PRESSURE: 64 MMHG | BODY MASS INDEX: 21.9 KG/M2 | OXYGEN SATURATION: 99 % | HEIGHT: 61 IN | RESPIRATION RATE: 16 BRPM | WEIGHT: 116 LBS | SYSTOLIC BLOOD PRESSURE: 112 MMHG

## 2025-03-25 DIAGNOSIS — F01.50 MIXED ALZHEIMER'S AND VASCULAR DEMENTIA (HCC): Primary | ICD-10-CM

## 2025-03-25 DIAGNOSIS — F02.80 MIXED ALZHEIMER'S AND VASCULAR DEMENTIA (HCC): Primary | ICD-10-CM

## 2025-03-25 DIAGNOSIS — G30.9 MIXED ALZHEIMER'S AND VASCULAR DEMENTIA (HCC): Primary | ICD-10-CM

## 2025-03-25 PROCEDURE — 1090F PRES/ABSN URINE INCON ASSESS: CPT | Performed by: PSYCHIATRY & NEUROLOGY

## 2025-03-25 PROCEDURE — 1159F MED LIST DOCD IN RCRD: CPT | Performed by: PSYCHIATRY & NEUROLOGY

## 2025-03-25 PROCEDURE — G8420 CALC BMI NORM PARAMETERS: HCPCS | Performed by: PSYCHIATRY & NEUROLOGY

## 2025-03-25 PROCEDURE — 99214 OFFICE O/P EST MOD 30 MIN: CPT | Performed by: PSYCHIATRY & NEUROLOGY

## 2025-03-25 PROCEDURE — G8427 DOCREV CUR MEDS BY ELIG CLIN: HCPCS | Performed by: PSYCHIATRY & NEUROLOGY

## 2025-03-25 PROCEDURE — G8400 PT W/DXA NO RESULTS DOC: HCPCS | Performed by: PSYCHIATRY & NEUROLOGY

## 2025-03-25 PROCEDURE — 1123F ACP DISCUSS/DSCN MKR DOCD: CPT | Performed by: PSYCHIATRY & NEUROLOGY

## 2025-03-25 PROCEDURE — 1036F TOBACCO NON-USER: CPT | Performed by: PSYCHIATRY & NEUROLOGY

## 2025-03-25 ASSESSMENT — PATIENT HEALTH QUESTIONNAIRE - PHQ9
2. FEELING DOWN, DEPRESSED OR HOPELESS: NOT AT ALL
SUM OF ALL RESPONSES TO PHQ QUESTIONS 1-9: 0
SUM OF ALL RESPONSES TO PHQ QUESTIONS 1-9: 0
1. LITTLE INTEREST OR PLEASURE IN DOING THINGS: NOT AT ALL
SUM OF ALL RESPONSES TO PHQ QUESTIONS 1-9: 0
SUM OF ALL RESPONSES TO PHQ QUESTIONS 1-9: 0

## 2025-03-25 NOTE — PROGRESS NOTES
Chief Complaint   Patient presents with    Follow-up     [facility] alzheimer's and vascular dementia       HISTORY OF PRESENT ILLNESS  Julia Mack came back for follow-up.  She came with her  and daughter.  Clinically she seems to be stable.  Remains functional and independent with basic activities of daily living.  She does tend to repeat herself.    She does not drive anymore as she has a tendency to forget where she is going  She needs guidance/supervision with certain activities.  She cannot go out of the house by herself and will usually need direction about what needs to be done.  She is able to take the pills out of the pillbox.  Does not cook much anymore  She does crossword puzzles, watches game shows, goes out for walks regularly    She had comprehensive neuropsychological assessment and it was consistent with dementia and anxiety.    Currently taking donepezil 10 mg daily and memantine 10 Mg twice a day.     RECAP  She has been having short-term memory issues for the past 3 years.  Family says that she will tend to ask the same questions or will repeat something that she has already said.  She has also had some trouble remembering people's names and at times will have difficulty cooking recipes that she has done for a long period.  She will by certain grocery items twice as she forgets that she has already it bought before.  She has had occasional confusion about where she is and how to get to a certain place while driving but it eventually comes to her.  Denies any headache, changes in vision, speech, swallowing ability, focal motor or sensory deficits, tremors, balance problems or falls.   She takes citalopram for anxiety and has been on it for several years.  She keeps wondering that if it was the medication that was giving her memory problem.  She does feel anxious every now and then but nothing significant.     Current Outpatient Medications   Medication Sig    memantine (NAMENDA) 10 MG

## 2025-04-23 ENCOUNTER — HOSPITAL ENCOUNTER (OUTPATIENT)
Facility: HOSPITAL | Age: 85
Discharge: HOME | End: 2025-04-26
Attending: PSYCHIATRY & NEUROLOGY
Payer: MEDICARE

## 2025-04-23 ENCOUNTER — HOSPITAL ENCOUNTER (OUTPATIENT)
Facility: HOSPITAL | Age: 85
End: 2025-04-23
Attending: PSYCHIATRY & NEUROLOGY
Payer: MEDICARE

## 2025-04-23 DIAGNOSIS — G30.9 MIXED ALZHEIMER'S AND VASCULAR DEMENTIA (HCC): ICD-10-CM

## 2025-04-23 DIAGNOSIS — F02.80 MIXED ALZHEIMER'S AND VASCULAR DEMENTIA (HCC): ICD-10-CM

## 2025-04-23 DIAGNOSIS — F01.50 MIXED ALZHEIMER'S AND VASCULAR DEMENTIA (HCC): ICD-10-CM

## 2025-04-23 PROCEDURE — 78814 PET IMAGE W/CT LMTD: CPT

## 2025-04-23 PROCEDURE — A9552 F18 FDG: HCPCS | Performed by: PSYCHIATRY & NEUROLOGY

## 2025-04-23 PROCEDURE — 3430000000 HC RX DIAGNOSTIC RADIOPHARMACEUTICAL: Performed by: PSYCHIATRY & NEUROLOGY

## 2025-04-23 RX ORDER — FLUDEOXYGLUCOSE F-18 500 MCI/ML
10 INJECTION INTRAVENOUS ONCE
Status: COMPLETED | OUTPATIENT
Start: 2025-04-23 | End: 2025-04-23

## 2025-04-23 RX ADMIN — FLUDEOXYGLUCOSE F-18 10 MILLICURIE: 500 INJECTION INTRAVENOUS at 13:00

## 2025-04-24 ENCOUNTER — RESULTS FOLLOW-UP (OUTPATIENT)
Age: 85
End: 2025-04-24

## 2025-04-24 NOTE — TELEPHONE ENCOUNTER
Called patient's daughter. No answer. Called patient's home phone. No answer. Left a VM stating I am calling regarding patient's PET scan results.    I pre made an appointment on 05/13 at 2:40PM but I will explain this to the patient/patient's  on why this was scheduled.

## 2025-04-24 NOTE — TELEPHONE ENCOUNTER
----- Message from Dr. Pradeep Perez MD sent at 4/24/2025  3:48 PM EDT -----  Please inform that amyloid PET scan is positive.  If you would like to discuss new IV treatments best to schedule a follow-up and proceed with APoE4 testing

## 2025-05-13 ENCOUNTER — OFFICE VISIT (OUTPATIENT)
Age: 85
End: 2025-05-13
Payer: MEDICARE

## 2025-05-13 VITALS
SYSTOLIC BLOOD PRESSURE: 124 MMHG | BODY MASS INDEX: 22.84 KG/M2 | HEIGHT: 61 IN | HEART RATE: 82 BPM | DIASTOLIC BLOOD PRESSURE: 82 MMHG | OXYGEN SATURATION: 97 % | WEIGHT: 121 LBS

## 2025-05-13 DIAGNOSIS — F03.90 DEMENTIA WITHOUT BEHAVIORAL DISTURBANCE (HCC): ICD-10-CM

## 2025-05-13 DIAGNOSIS — G30.9 DEMENTIA DUE TO ALZHEIMER'S DISEASE (HCC): Primary | ICD-10-CM

## 2025-05-13 DIAGNOSIS — F02.80 DEMENTIA DUE TO ALZHEIMER'S DISEASE (HCC): Primary | ICD-10-CM

## 2025-05-13 PROCEDURE — 99214 OFFICE O/P EST MOD 30 MIN: CPT | Performed by: PSYCHIATRY & NEUROLOGY

## 2025-05-13 PROCEDURE — 1159F MED LIST DOCD IN RCRD: CPT | Performed by: PSYCHIATRY & NEUROLOGY

## 2025-05-13 PROCEDURE — G8420 CALC BMI NORM PARAMETERS: HCPCS | Performed by: PSYCHIATRY & NEUROLOGY

## 2025-05-13 PROCEDURE — 1123F ACP DISCUSS/DSCN MKR DOCD: CPT | Performed by: PSYCHIATRY & NEUROLOGY

## 2025-05-13 PROCEDURE — G8400 PT W/DXA NO RESULTS DOC: HCPCS | Performed by: PSYCHIATRY & NEUROLOGY

## 2025-05-13 PROCEDURE — 1090F PRES/ABSN URINE INCON ASSESS: CPT | Performed by: PSYCHIATRY & NEUROLOGY

## 2025-05-13 PROCEDURE — G8427 DOCREV CUR MEDS BY ELIG CLIN: HCPCS | Performed by: PSYCHIATRY & NEUROLOGY

## 2025-05-13 PROCEDURE — 1036F TOBACCO NON-USER: CPT | Performed by: PSYCHIATRY & NEUROLOGY

## 2025-05-13 ASSESSMENT — PATIENT HEALTH QUESTIONNAIRE - PHQ9
SUM OF ALL RESPONSES TO PHQ QUESTIONS 1-9: 0
1. LITTLE INTEREST OR PLEASURE IN DOING THINGS: NOT AT ALL
SUM OF ALL RESPONSES TO PHQ QUESTIONS 1-9: 0
SUM OF ALL RESPONSES TO PHQ QUESTIONS 1-9: 0
2. FEELING DOWN, DEPRESSED OR HOPELESS: NOT AT ALL
SUM OF ALL RESPONSES TO PHQ QUESTIONS 1-9: 0

## 2025-05-13 NOTE — PROGRESS NOTES
Chief Complaint   Patient presents with    Neurologic Problem     Alzheimer's, vascular dementia, here with  and daughter.        HISTORY OF PRESENT ILLNESS  Julia Mack came back for follow-up.  She was recently seen in March.  She came with her  and daughter.  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  Clinically she has been stable.  Remains functional and independent with basic activities of daily living.  She does tend to repeat herself.    She does not drive anymore as she has a tendency to forget where she is going  She needs guidance/supervision with certain activities.  She cannot go out of the house by herself and will usually need direction about what needs to be done.  She is able to take the pills out of the pillbox.  Does not cook much anymore  She does crossword puzzles, watches game shows, goes out for walks regularly    She had comprehensive neuropsychological assessment and it was consistent with dementia and anxiety.    Currently taking donepezil 10 mg daily and memantine 10 Mg twice a day.  Has been feeling slightly dizzy    RECAP  She has been having short-term memory issues for the past 3 years.  Family says that she will tend to ask the same questions or will repeat something that she has already said.  She has also had some trouble remembering people's names and at times will have difficulty cooking recipes that she has done for a long period.  She will by certain grocery items twice as she forgets that she has already it bought before.  She has had occasional confusion about where she is and how to get to a certain place while driving but it eventually comes to her.  Denies any headache, changes in vision, speech, swallowing ability, focal motor or sensory deficits, tremors, balance problems or falls.   She takes citalopram for anxiety and has been on it for several years.  She keeps wondering that if it was the

## 2025-05-13 NOTE — PROGRESS NOTES
Chief Complaint   Patient presents with    Neurologic Problem     Alzheimer's, vascular dementia, here with  and daughter.      Vitals:    05/13/25 1441   BP: 124/82   Pulse: 82   SpO2: 97%

## 2025-05-19 ENCOUNTER — PATIENT MESSAGE (OUTPATIENT)
Age: 85
End: 2025-05-19

## 2025-05-22 ENCOUNTER — RESULTS FOLLOW-UP (OUTPATIENT)
Age: 85
End: 2025-05-22

## 2025-05-22 LAB
APOE GENE MUT ANL BLD/T: NORMAL
CITATION REF LAB TEST: NORMAL
LABORATORY COMMENT REPORT: NORMAL
PROVIDER SIGNING NAME: NORMAL
REF LAB TEST METHOD: NORMAL
TEST PERFORMANCE INFO SPEC: NORMAL
TEST PERFORMANCE INFO SPEC: NORMAL

## 2025-05-22 NOTE — TELEPHONE ENCOUNTER
----- Message from Dr. 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 Leqembi or Kisunla

## 2025-06-25 ENCOUNTER — TELEPHONE (OUTPATIENT)
Age: 85
End: 2025-06-25

## 2025-06-25 ENCOUNTER — OFFICE VISIT (OUTPATIENT)
Age: 85
End: 2025-06-25
Payer: MEDICARE

## 2025-06-25 VITALS
HEIGHT: 61 IN | WEIGHT: 121 LBS | OXYGEN SATURATION: 99 % | DIASTOLIC BLOOD PRESSURE: 80 MMHG | SYSTOLIC BLOOD PRESSURE: 118 MMHG | HEART RATE: 78 BPM | BODY MASS INDEX: 22.84 KG/M2

## 2025-06-25 DIAGNOSIS — G30.9 DEMENTIA DUE TO ALZHEIMER'S DISEASE (HCC): Primary | ICD-10-CM

## 2025-06-25 DIAGNOSIS — F02.80 DEMENTIA DUE TO ALZHEIMER'S DISEASE (HCC): Primary | ICD-10-CM

## 2025-06-25 PROCEDURE — 1123F ACP DISCUSS/DSCN MKR DOCD: CPT | Performed by: PSYCHIATRY & NEUROLOGY

## 2025-06-25 PROCEDURE — 1036F TOBACCO NON-USER: CPT | Performed by: PSYCHIATRY & NEUROLOGY

## 2025-06-25 PROCEDURE — 99214 OFFICE O/P EST MOD 30 MIN: CPT | Performed by: PSYCHIATRY & NEUROLOGY

## 2025-06-25 PROCEDURE — G8427 DOCREV CUR MEDS BY ELIG CLIN: HCPCS | Performed by: PSYCHIATRY & NEUROLOGY

## 2025-06-25 PROCEDURE — G8420 CALC BMI NORM PARAMETERS: HCPCS | Performed by: PSYCHIATRY & NEUROLOGY

## 2025-06-25 PROCEDURE — 1159F MED LIST DOCD IN RCRD: CPT | Performed by: PSYCHIATRY & NEUROLOGY

## 2025-06-25 ASSESSMENT — PATIENT HEALTH QUESTIONNAIRE - PHQ9
1. LITTLE INTEREST OR PLEASURE IN DOING THINGS: NOT AT ALL
2. FEELING DOWN, DEPRESSED OR HOPELESS: NOT AT ALL
SUM OF ALL RESPONSES TO PHQ QUESTIONS 1-9: 0

## 2025-06-25 NOTE — PROGRESS NOTES
Chief Complaint   Patient presents with    Neurologic Problem     Alzheimer's, here with  and daughter to discuss treatment options.      Vitals:    06/25/25 1127   BP: 118/80   Pulse: 78   SpO2: 99%

## 2025-06-25 NOTE — PATIENT INSTRUCTIONS
Information about Kisunla:  -It is given via IV infusion through a needle placed in a vein in the arm once every 4 weeks  -Each infusion takes around 30 minutes  -After the first infusion, you will need to stay at the infusion site for an additional 30 minutes for observation  -You will need an MRIs prior to infusions 2, 3, 4, and 7 (you can not continue infusions until MRI's are done before these infusions)     Please allow up to 2-3 weeks for the infusion site to get the prior authorization approval      [facility]  Address: [address] [address]  Phone: [phone]        Thank you

## 2025-06-25 NOTE — TELEPHONE ENCOUNTER
Called patient. Informed her that we will need an MRI brain as a baseline before we start Kisunla/send orders to [facility]. Number to central scheduling left on VM. Once completed, will notify for results and send over everything to [facility]. Also called the daughter and left a VM of this as well.

## 2025-06-25 NOTE — PROGRESS NOTES
Chief Complaint   Patient presents with    Neurologic Problem     Alzheimer's, here with  and daughter to discuss treatment options.        HISTORY OF PRESENT ILLNESS  Julia Mack came back for follow-up.  She was recently seen on May 13, 2025.  She came with her  and daughter.  She came in to discuss newer antiamyloid treatments for Alzheimer's type dementia.   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  Echo E4 testing showed E3/E4 profile    Clinically she has been stable.  Remains functional and independent with basic activities of daily living.  She does tend to repeat herself.  She does not drive anymore as she has a tendency to forget where she is going  She needs guidance/supervision with certain activities.  She cannot go out of the house by herself and will usually need direction about what needs to be done.  She is able to take the pills out of the pillbox.  Does not cook much anymore  She does crossword puzzles, watches game shows, goes out for walks regularly    She had comprehensive neuropsychological assessment and it was consistent with dementia and anxiety.    Currently taking donepezil 10 mg daily and memantine 10 Mg twice a day.  Has been feeling slightly dizzy on these medications.    RECAP  She has been having short-term memory issues for the past 3 years.  Family says that she will tend to ask the same questions or will repeat something that she has already said.  She has also had some trouble remembering people's names and at times will have difficulty cooking recipes that she has done for a long period.  She will by certain grocery items twice as she forgets that she has already it bought before.  She has had occasional confusion about where she is and how to get to a certain place while driving but it eventually comes to her.  Denies any headache, changes in vision, speech, swallowing ability, focal motor or

## 2025-07-08 ENCOUNTER — HOSPITAL ENCOUNTER (OUTPATIENT)
Facility: HOSPITAL | Age: 85
Discharge: HOME | End: 2025-07-11
Attending: PSYCHIATRY & NEUROLOGY
Payer: MEDICARE

## 2025-07-08 DIAGNOSIS — G30.9 DEMENTIA DUE TO ALZHEIMER'S DISEASE (HCC): ICD-10-CM

## 2025-07-08 DIAGNOSIS — F02.80 DEMENTIA DUE TO ALZHEIMER'S DISEASE (HCC): ICD-10-CM

## 2025-07-08 PROCEDURE — 70551 MRI BRAIN STEM W/O DYE: CPT

## 2025-07-10 DIAGNOSIS — F03.90 DEMENTIA WITHOUT BEHAVIORAL DISTURBANCE (HCC): ICD-10-CM

## 2025-07-10 RX ORDER — MEMANTINE HYDROCHLORIDE 10 MG/1
10 TABLET ORAL 2 TIMES DAILY
Qty: 180 TABLET | Refills: 1 | Status: SHIPPED | OUTPATIENT
Start: 2025-07-10

## 2025-07-16 ENCOUNTER — TELEPHONE (OUTPATIENT)
Age: 85
End: 2025-07-16

## 2025-07-16 ENCOUNTER — CLINICAL DOCUMENTATION (OUTPATIENT)
Age: 85
End: 2025-07-16

## 2025-07-16 NOTE — PROGRESS NOTES
[facility] to [facility].    PET scan: 07/09/25  MRI Brain baseline: 07/09/25  FAQ: 17  MOCA: 20/30

## 2025-07-16 NOTE — TELEPHONE ENCOUNTER
Patient's  would like to proceed with the infusion (Noe). Will fax over orders to [facility]. Information on the infusion and infusion site was written on the AVS paper for patient to review.

## 2025-07-16 NOTE — TELEPHONE ENCOUNTER
Darin (on [facility]) is asking for a call back regarding IVIG for the patient. Phone number: [phone]

## 2025-07-18 ENCOUNTER — TELEPHONE (OUTPATIENT)
Age: 85
End: 2025-07-18

## 2025-07-18 NOTE — TELEPHONE ENCOUNTER
Called patient's  twice. No answer. Left a VM stating I can fax over orders to [facility] if he agrees. If so, I will fax over everything to them.    [facility] [address] [place] [address]  Call or Text: [phone]

## 2025-07-18 NOTE — TELEPHONE ENCOUNTER
Called patient's twice. No answer. Left a VM stating I received a call from [facility] stating that they unfortunately do not accept the insurance. Will try another infusion site for patient. Stated to call back when available.

## 2025-07-21 ENCOUNTER — CLINICAL DOCUMENTATION (OUTPATIENT)
Age: 85
End: 2025-07-21

## 2025-07-21 NOTE — PROGRESS NOTES
[facility] number: [id number]  Submission registry number: [id number]    PET scan: 04/23/25  MRI brain baseline: 04/23/25  APOE lab: 05/13/25-showed 1 copy of the gene   MOCA score: 20/30  FAQ score: 17/30    Sending this to [facility].

## 2025-07-30 ENCOUNTER — TELEPHONE (OUTPATIENT)
Age: 85
End: 2025-07-30

## 2025-07-30 NOTE — TELEPHONE ENCOUNTER
Darin (on [facility]) called asking that an MRI order be put in. He states that the patient already had her infusion and now needs to schedule an MRI.

## 2025-07-31 DIAGNOSIS — G30.9 DEMENTIA DUE TO ALZHEIMER'S DISEASE (HCC): Primary | ICD-10-CM

## 2025-07-31 DIAGNOSIS — F02.80 DEMENTIA DUE TO ALZHEIMER'S DISEASE (HCC): Primary | ICD-10-CM

## 2025-07-31 NOTE — TELEPHONE ENCOUNTER
Called patient's . Stated the MRI brain is now ordered. Needs to be completed between the time frame of 08/01-08/22 as 2nd infusion is on 08/27.

## 2025-08-08 ENCOUNTER — HOSPITAL ENCOUNTER (OUTPATIENT)
Facility: HOSPITAL | Age: 85
Discharge: HOME | End: 2025-08-11
Attending: PSYCHIATRY & NEUROLOGY
Payer: MEDICARE

## 2025-08-08 DIAGNOSIS — G30.9 DEMENTIA DUE TO ALZHEIMER'S DISEASE (HCC): ICD-10-CM

## 2025-08-08 DIAGNOSIS — F02.80 DEMENTIA DUE TO ALZHEIMER'S DISEASE (HCC): ICD-10-CM

## 2025-08-08 PROCEDURE — 70551 MRI BRAIN STEM W/O DYE: CPT

## 2025-08-11 ENCOUNTER — RESULTS FOLLOW-UP (OUTPATIENT)
Age: 85
End: 2025-08-11

## 2025-08-22 ENCOUNTER — ANCILLARY ORDERS (OUTPATIENT)
Age: 85
End: 2025-08-22

## 2025-08-22 DIAGNOSIS — F02.80 MIXED ALZHEIMER'S AND VASCULAR DEMENTIA (HCC): Primary | ICD-10-CM

## 2025-08-22 DIAGNOSIS — F01.50 MIXED ALZHEIMER'S AND VASCULAR DEMENTIA (HCC): Primary | ICD-10-CM

## 2025-08-22 DIAGNOSIS — G30.9 MIXED ALZHEIMER'S AND VASCULAR DEMENTIA (HCC): Primary | ICD-10-CM

## 2025-08-22 DIAGNOSIS — F03.90 DEMENTIA WITHOUT BEHAVIORAL DISTURBANCE (HCC): ICD-10-CM

## (undated) DEVICE — SWAB CULT DBL W/O CHAR RAYON TIP AMIES GEL CLMN FOR COLL

## (undated) DEVICE — STERILE POLYISOPRENE POWDER-FREE SURGICAL GLOVES WITH EMOLLIENT COATING: Brand: PROTEXIS

## (undated) DEVICE — DRAPE,U/ SHT,SPLIT,PLAS,STERIL: Brand: MEDLINE

## (undated) DEVICE — 3M™ STERI-DRAPE™ 2 INCISE DRAPE 2050: Brand: STERI-DRAPE™

## (undated) DEVICE — STRAP,POSITIONING,KNEE/BODY,FOAM,4X60": Brand: MEDLINE

## (undated) DEVICE — PAD BD MATTRESS 73X32 IN STD CONVOLUTED FOAM LTX FREE

## (undated) DEVICE — Device

## (undated) DEVICE — NEEDLE HYPO 21GA L1.5IN INTRAMUSCULAR S STL LATCH BVL UP

## (undated) DEVICE — BIT DRL L5IN DIA2MM STD ST S STL TWST BUSA

## (undated) DEVICE — HEWSON SUTURE RETRIEVER: Brand: HEWSON SUTURE RETRIEVER

## (undated) DEVICE — GOWN,PREVENTION PLUS,XLN/2XL,ST,22/CS: Brand: MEDLINE

## (undated) DEVICE — PATIENT PROTECTIVE PAD FOR IMP UNIVERSAL LATERAL HIP POSITIONER (ULP) (6/CASE): Brand: PATIENT PROTECTIVE PAD

## (undated) DEVICE — SUTURE ETHBND EXCEL SZ 2 L30IN NONABSORBABLE GRN L40MM V-37 MX69G

## (undated) DEVICE — SOL IRR SOD CL 0.9% 3000ML --

## (undated) DEVICE — Z DUP USE 2275493 DRESSING ALGINATE POST OPERATIVE 10X3.5 IN RECT PRIMASEAL

## (undated) DEVICE — 3M™ IOBAN™ 2 ANTIMICROBIAL INCISE DRAPE 6651EZ: Brand: IOBAN™ 2

## (undated) DEVICE — SYR 20ML LL STRL LF --

## (undated) DEVICE — PREP SKN CHLRAPRP APL 26ML STR --

## (undated) DEVICE — BLADE SURG 19.1X70X1.6MM SAW SAG STRYKR 1MM CUT THICKNESS S

## (undated) DEVICE — SUTURE VCRL SZ 1 L27IN ABSRB VLT L36MM CT-1 1/2 CIR J341H

## (undated) DEVICE — HANDPIECE SET WITH BONE CLEANING TIP AND SUCTION TUBE: Brand: INTERPULSE

## (undated) DEVICE — 4-PORT MANIFOLD: Brand: NEPTUNE 2

## (undated) DEVICE — SUTURE MCRYL SZ 3-0 L27IN ABSRB UD L19MM PS-2 3/8 CIR PRIM Y427H

## (undated) DEVICE — 2108 SERIES SAGITTAL BLADE AGGRESSIVE  (25.0 X 1.19 X 85.0MM)

## (undated) DEVICE — SCRUB DRY SURG EZ SCRUB BRUSH PREOPERATIVE GRN

## (undated) DEVICE — CONTAINER,SPECIMEN,3OZ,OR STRL: Brand: MEDLINE

## (undated) DEVICE — SOLUTION SURG PREP 26 CC PURPREP

## (undated) DEVICE — SUTURE STRATAFIX SPRL SZ 1 L14IN ABSRB VLT L48CM CTX 1/2 SXPD2B405

## (undated) DEVICE — REM POLYHESIVE ADULT PATIENT RETURN ELECTRODE: Brand: VALLEYLAB

## (undated) DEVICE — STERILE POLYISOPRENE POWDER-FREE SURGICAL GLOVES: Brand: PROTEXIS

## (undated) DEVICE — TOTAL TRAY, 16FR 10ML SIL FOLEY, URN: Brand: MEDLINE

## (undated) DEVICE — INFECTION CONTROL KIT SYS

## (undated) DEVICE — SOL IRR STRL H2O 1000ML BTL --

## (undated) DEVICE — YANKAUER,OPEN TIP,W/O VENT,STERILE: Brand: MEDLINE INDUSTRIES, INC.

## (undated) DEVICE — ALCOHOL RUBBING ISO 16OZ 70%

## (undated) DEVICE — SUTURE VCRL SZ 2-0 L27IN ABSRB UD L36MM CP-1 1/2 CIR REV J266H

## (undated) DEVICE — TIP SUCT CRV REG REDI

## (undated) DEVICE — SPONGE GZ W4XL4IN COT RADPQ HIGHLY ABSRB

## (undated) DEVICE — DERMABOND SKIN ADH 0.7ML -- DERMABOND ADVANCED 12/BX

## (undated) DEVICE — T4 HOOD

## (undated) DEVICE — COVER,MAYO STAND,STERILE: Brand: MEDLINE